# Patient Record
Sex: FEMALE | Race: WHITE | Employment: FULL TIME | URBAN - METROPOLITAN AREA
[De-identification: names, ages, dates, MRNs, and addresses within clinical notes are randomized per-mention and may not be internally consistent; named-entity substitution may affect disease eponyms.]

---

## 2017-02-16 ENCOUNTER — TELEPHONE (OUTPATIENT)
Dept: FAMILY MEDICINE | Facility: OTHER | Age: 52
End: 2017-02-16

## 2017-02-16 NOTE — TELEPHONE ENCOUNTER
Summary:    Patient is due/failing the following:   COLONOSCOPY    Action needed:   Schedule a colonoscopy    Type of outreach:    Phone, left message for patient to call back.     Questions for provider review:    None                                                                                                                                    Betzaida Glover       Chart routed to Care Team .  Panel Management Review      Patient has the following on her problem list: None      Composite cancer screening  Chart review shows that this patient is due/due soon for the following Colonoscopy

## 2017-03-24 ENCOUNTER — TELEPHONE (OUTPATIENT)
Dept: FAMILY MEDICINE | Facility: OTHER | Age: 52
End: 2017-03-24

## 2017-03-24 DIAGNOSIS — N92.6 IRREGULAR MENSTRUAL CYCLE: ICD-10-CM

## 2017-03-24 DIAGNOSIS — Z00.00 ROUTINE GENERAL MEDICAL EXAMINATION AT A HEALTH CARE FACILITY: ICD-10-CM

## 2017-03-24 RX ORDER — LEVONORGESTREL/ETHIN.ESTRADIOL 0.1-0.02MG
1 TABLET ORAL DAILY
Qty: 28 TABLET | Refills: 0 | Status: SHIPPED | OUTPATIENT
Start: 2017-03-24 | End: 2017-04-10

## 2017-03-24 NOTE — TELEPHONE ENCOUNTER
Noemy    Last Written Prescription Date: 7-25-16  Last Fill Quantity: 84,  # refills: 2   [place] [facility] prescribing provider: 12-28-16

## 2017-03-24 NOTE — TELEPHONE ENCOUNTER
Medication is being filled for 1 time refill only due to:  patient needs to establish with new provider as Nieves Kimani is no longer in clinic please call patient to help schedule OV.   Due for mammo, colon screening and help c screening per HM.     Gisela Zhou, RN, BSN

## 2017-03-24 NOTE — TELEPHONE ENCOUNTER
Spoke with pt and gave her message below. She will make an appt for next month to est with new provider.

## 2017-04-04 NOTE — PATIENT INSTRUCTIONS
Seborrheic keratosis -- please check with insurance for coverage for removal.  We can remove on chin, but would not on the anterior neck due to underlying vessels.      Preventive Health Recommendations  Female Ages 50 - 64    Yearly exam: See your health care provider every year in order to  o Review health changes.   o Discuss preventive care.    o Review your medicines if your doctor has prescribed any.      Get a Pap test every three years (unless you have an abnormal result and your provider advises testing more often).    If you get Pap tests with HPV test, you only need to test every 5 years, unless you have an abnormal result.     You do not need a Pap test if your uterus was removed (hysterectomy) and you have not had cancer.    You should be tested each year for STDs (sexually transmitted diseases) if you're at risk.     Have a mammogram every 1 to 2 years.    Have a colonoscopy at age 50, or have a yearly FIT test (stool test). These exams screen for colon cancer.      Have a cholesterol test every 5 years, or more often if advised.    Have a diabetes test (fasting glucose) every three years. If you are at risk for diabetes, you should have this test more often.     If you are at risk for osteoporosis (brittle bone disease), think about having a bone density scan (DEXA).    Shots: Get a flu shot each year. Get a tetanus shot every 10 years.    Nutrition:     Eat at least 5 servings of fruits and vegetables each day.    Eat whole-grain bread, whole-wheat pasta and brown rice instead of white grains and rice.    Talk to your provider about Calcium and Vitamin D.     Lifestyle    Exercise at least 150 minutes a week (30 minutes a day, 5 days a week). This will help you control your weight and prevent disease.    Limit alcohol to one drink per day.    No smoking.     Wear sunscreen to prevent skin cancer.     See your dentist every six months for an exam and cleaning.    See your eye doctor every 1 to 2  years.

## 2017-04-04 NOTE — PROGRESS NOTES
SUBJECTIVE:     CC: Kristin Orta is an 51 year old woman who presents for preventive health visit.     Physical   Annual:     Getting at least 3 servings of Calcium per day::  Yes    Bi-annual eye exam::  Yes    Dental care twice a year::  Yes    Sleep apnea or symptoms of sleep apnea::  None    Diet::  Regular (no restrictions)    Frequency of exercise::  6-7 days/week    Duration of exercise::  Greater than 60 minutes    Taking medications regularly::  Yes    Medication side effects::  None    Additional concerns today::  No    Currently taking birth control and is comfortable with the medication.    Today's PHQ-2 Score:   PHQ-2 ( 1999 Pfizer) 4/10/2017   Q1: Little interest or pleasure in doing things -   Q2: Feeling down, depressed or hopeless -   PHQ-2 Score -   Little interest or pleasure in doing things Not at all   Feeling down, depressed or hopeless Not at all   PHQ-2 Score 0     Abuse: Current or Past(Physical, Sexual or Emotional)- No  Do you feel safe in your environment - Yes    Social History   Substance Use Topics     Smoking status: Never Smoker     Smokeless tobacco: Never Used      Comment: No smokers in home     Alcohol use Yes      Comment: 2 drinks per month     The patient does not drink >3 drinks per day nor >7 drinks per week.    Recent Labs   Lab Test  04/06/15   1032  03/31/11   0955   CHOL  175  177   HDL  78  66   LDL  87  95   TRIG  52  77   CHOLHDLRATIO  2.2  3.0       Reviewed orders with patient.  Reviewed health maintenance and updated orders accordingly - Yes    Mammo Decision Support:  Patient over age 50, mutual decision to screen reflected in health maintenance.    Pertinent mammograms are reviewed under the imaging tab.  History of abnormal Pap smear: NO - age 30- 65 PAP every 3 years recommended    Reviewed and updated as needed this visit by clinical staff  Tobacco  Med Hx  Surg Hx  Fam Hx  Soc Hx        Reviewed and updated as needed this visit by Provider       "  Past Medical History:   Diagnosis Date     Esophageal reflux 4/2004    ?     Unspecified constipation 4/2004    relieved with dietary changes     Unspecified contraceptive management         ROS:  Constitutional, HEENT, cardiovascular, pulmonary, GI, , musculoskeletal, neuro, skin, endocrine and psych systems are negative, except as in HPI or otherwise noted     This document serves as a record of the services and decisions personally performed and made by Joya Rahman MD. It was created on her behalf by Mary Ann Toro , a trained medical scribe. The creation of this document is based the provider's statements to the medical scribe.  Mary Ann Toro, April 10, 2017 9:40 AM     Problem list, Medication list, Allergies, and Medical/Social/Surgical histories reviewed in [employer] and updated as appropriate.  OBJECTIVE:     /66 (BP Location: Left arm, Patient Position: Chair, Cuff Size: Adult Regular)  Pulse 52  Temp 97.9  F (36.6  C) (Temporal)  Resp 16  Ht 1.594 m (5' 2.75\")  Wt 59.9 kg (132 lb)  LMP 03/27/2017 (Exact Date)  Breastfeeding? No  BMI 23.57 kg/m2  EXAM:  GENERAL: healthy, alert and no distress  EYES: Eyes grossly normal to inspection, PERRL and conjunctivae and sclerae normal  HENT: ear canals and TM's normal, nose and mouth without ulcers or lesions  NECK: no adenopathy, no asymmetry, masses, or scars and thyroid normal to palpation  RESP: lungs clear to auscultation - no rales, rhonchi or wheezes  BREAST: normal without masses, tenderness or nipple discharge and no palpable axillary masses or adenopathy  CV: regular rate and rhythm, normal S1 S2, no S3 or S4, no murmur, click or rub, no peripheral edema and peripheral pulses strong  ABDOMEN: soft, nontender, no hepatosplenomegaly, no masses and bowel sounds normal  MS: no gross musculoskeletal defects noted, no edema  SKIN: SK noted on the under side of the chin.  Skin colored raised mole noted on anterior neck more c/w skin tag  NEURO: Normal " "strength and tone, mentation intact and speech normal  PSYCH: mentation appears normal, affect normal/bright    ASSESSMENT/PLAN:         ICD-10-CM    1. Routine general medical examination at a health care facility Z00.00 levonorgestrel-ethinyl estradiol (AVIANE,ALESSE,LESSINA) 0.1-20 MG-MCG per tablet   2. Screen for colon cancer Z12.11 GASTROENTEROLOGY ADULT REF PROCEDURE ONLY   3. Visit for screening mammogram Z12.31 MA SCREENING DIGITAL BILAT - Future  (s+30)   4. Need for hepatitis C screening test Z11.59    5. Perimenopausal N95.1 Follicle stimulating hormone   6. Encounter for routine adult health examination without abnormal findings Z00.00    7. Irregular menstrual cycle N92.6 levonorgestrel-ethinyl estradiol (AVIANE,ALESSE,LESSINA) 0.1-20 MG-MCG per tablet     Due to benign features of the moles, advised not needing removal, but check with insurance first.  Chin can be done here, anterior neck cannot.  GI[facility] SK handout.  Also discussed FSH for monitoring for menopause.  Planning this summer and will see if she needs further hormones if negative.    COUNSELING:  Reviewed preventive health counseling, as reflected in patient instructions       Regular exercise       Healthy diet/nutrition       Vision screening       Hearing screening       Contraception       Colon cancer screening       Consider Hep C screening for patients born between 1945 and 1965       (Meaghan)menopause management     reports that she has never smoked. She has never used smokeless tobacco.    Estimated body mass index is 23.57 kg/(m^2) as calculated from the following:    Height as of this encounter: 1.594 m (5' 2.75\").    Weight as of this encounter: 59.9 kg (132 lb).     Counseling Resources:  ATP IV Guidelines  Pooled Cohorts Equation Calculator  Breast Cancer Risk Calculator  FRAX Risk Assessment  ICSI Preventive Guidelines  Dietary Guidelines for Americans, 2010  USDA's MyPlate  ASA Prophylaxis  Lung CA Screening    The information " in this document, created by the medical scribe for me, accurately reflects the services I personally performed and the decisions made by me. I have reviewed and approved this document for accuracy.   MD Joya Carter MD, MD  [facility]

## 2017-04-10 ENCOUNTER — OFFICE VISIT (OUTPATIENT)
Dept: FAMILY MEDICINE | Facility: OTHER | Age: 52
End: 2017-04-10
Payer: COMMERCIAL

## 2017-04-10 VITALS
SYSTOLIC BLOOD PRESSURE: 102 MMHG | RESPIRATION RATE: 16 BRPM | DIASTOLIC BLOOD PRESSURE: 66 MMHG | HEIGHT: 63 IN | WEIGHT: 132 LBS | BODY MASS INDEX: 23.39 KG/M2 | TEMPERATURE: 97.9 F | HEART RATE: 52 BPM

## 2017-04-10 DIAGNOSIS — L82.1 SEBORRHEIC KERATOSES: ICD-10-CM

## 2017-04-10 DIAGNOSIS — Z00.00 ENCOUNTER FOR ROUTINE ADULT HEALTH EXAMINATION WITHOUT ABNORMAL FINDINGS: ICD-10-CM

## 2017-04-10 DIAGNOSIS — N95.1 PERIMENOPAUSAL: ICD-10-CM

## 2017-04-10 DIAGNOSIS — N92.6 IRREGULAR MENSTRUAL CYCLE: ICD-10-CM

## 2017-04-10 DIAGNOSIS — Z00.00 ROUTINE GENERAL MEDICAL EXAMINATION AT A HEALTH CARE FACILITY: Primary | ICD-10-CM

## 2017-04-10 DIAGNOSIS — Z12.31 VISIT FOR SCREENING MAMMOGRAM: ICD-10-CM

## 2017-04-10 DIAGNOSIS — Z12.11 SCREEN FOR COLON CANCER: ICD-10-CM

## 2017-04-10 DIAGNOSIS — Z11.59 NEED FOR HEPATITIS C SCREENING TEST: ICD-10-CM

## 2017-04-10 PROCEDURE — 99396 PREV VISIT EST AGE 40-64: CPT | Performed by: FAMILY MEDICINE

## 2017-04-10 RX ORDER — LEVONORGESTREL/ETHIN.ESTRADIOL 0.1-0.02MG
1 TABLET ORAL DAILY
Qty: 84 TABLET | Refills: 3 | Status: SHIPPED | OUTPATIENT
Start: 2017-04-10 | End: 2018-03-27

## 2017-04-10 ASSESSMENT — PAIN SCALES - GENERAL: PAINLEVEL: NO PAIN (0)

## 2017-04-10 NOTE — NURSING NOTE
"Chief Complaint   Patient presents with     Physical     Panel Management     height, mammo, colon, hep c       Initial /66 (BP Location: Left arm, Patient Position: Chair, Cuff Size: Adult Regular)  Pulse 52  Temp 97.9  F (36.6  C) (Temporal)  Resp 16  Ht 5' 2.75\" (1.594 m)  Wt 132 lb (59.9 kg)  LMP 03/27/2017 (Exact Date)  Breastfeeding? No  BMI 23.57 kg/m2 Estimated body mass index is 23.57 kg/(m^2) as calculated from the following:    Height as of this encounter: 5' 2.75\" (1.594 m).    Weight as of this encounter: 132 lb (59.9 kg).  Medication Reconciliation: complete  "

## 2017-04-10 NOTE — MR AVS SNAPSHOT
After Visit Summary   4/10/2017    Kristin Orta    MRN: [id number]           Patient Information     Date Of Birth          1965        Visit Information        Provider Department      4/10/2017 9:30 AM Joya Rahman MD [facility]        Today's Diagnoses     Routine general medical examination at a health care facility    -  1    Screen for colon cancer        Visit for screening mammogram        Need for hepatitis C screening test        Perimenopausal        Encounter for routine adult health examination without abnormal findings        Irregular menstrual cycle        Seborrheic keratoses          Care Instructions      Seborrheic keratosis -- please check with insurance for coverage for removal.  We can remove on chin, but would not on the anterior neck due to underlying vessels.      Preventive Health Recommendations  Female Ages 50 - 64    Yearly exam: See your health care provider every year in order to  o Review health changes.   o Discuss preventive care.    o Review your medicines if your doctor has prescribed any.      Get a Pap test every three years (unless you have an abnormal result and your provider advises testing more often).    If you get Pap tests with HPV test, you only need to test every 5 years, unless you have an abnormal result.     You do not need a Pap test if your uterus was removed (hysterectomy) and you have not had cancer.    You should be tested each year for STDs (sexually transmitted diseases) if you're at risk.     Have a mammogram every 1 to 2 years.    Have a colonoscopy at age 50, or have a yearly FIT test (stool test). These exams screen for colon cancer.      Have a cholesterol test every 5 years, or more often if advised.    Have a diabetes test (fasting glucose) every three years. If you are at risk for diabetes, you should have this test more often.     If you are at risk for osteoporosis (brittle bone disease), think about having  a bone density scan (DEXA).    Shots: Get a flu shot each year. Get a tetanus shot every 10 years.    Nutrition:     Eat at least 5 servings of fruits and vegetables each day.    Eat whole-grain bread, whole-wheat pasta and brown rice instead of white grains and rice.    Talk to your provider about Calcium and Vitamin D.     Lifestyle    Exercise at least 150 minutes a week (30 minutes a day, 5 days a week). This will help you control your weight and prevent disease.    Limit alcohol to one drink per day.    No smoking.     Wear sunscreen to prevent skin cancer.     See your dentist every six months for an exam and cleaning.    See your eye doctor every 1 to 2 years.          Follow-ups after your visit        Additional Services     GASTROENTEROLOGY ADULT REF PROCEDURE ONLY       Last Lab Result: No results found for: CR  There is no height or weight on file to calculate BMI.     Needed:  No  Language:  English    Patient will be contacted to schedule procedure.     Please be aware that coverage of these services is subject to the terms and limitations of your health insurance plan.  Call member services at your health plan with any benefit or coverage questions.  Any procedures must be performed at a [facility] facility OR coordinated by your clinic's referral office.    Please bring the following with you to your appointment:    (1) Any X-Rays, CTs or MRIs which have been performed.  Contact the facility where they were done to arrange for  prior to your scheduled appointment.    (2) List of current medications   (3) This referral request   (4) Any documents/labs given to you for this referral                  Your next 10 appointments already scheduled     Apr 13, 2017  8:45 AM CDT   MA SCREENING DIGITAL BILATERAL with PHMA1   [facility] ([facility])    [address] [place] [id number] [phone]           Do not use any powder, lotion or deodorant  under your arms or on your breast. If you do, we will ask you to remove it before your exam.  Wear comfortable, two-piece clothing.  If you have any allergies, tell your care team.  Bring any previous mammograms from other facilities or have them mailed to the breast center.              Future tests that were ordered for you today     Open Future Orders        Priority Expected Expires Ordered    Follicle stimulating hormone Routine  8/10/2017 4/10/2017    MA SCREENING DIGITAL BILAT - Future  (s+30) Routine  4/4/2018 4/10/2017            Who to contact     If you have questions or need follow up information about today's clinic visit or your schedule please contact [facility] directly at [phone].  Normal or non-critical lab and imaging results will be communicated to you by MyChart, letter or phone within 4 business days after the clinic has received the results. If you do not hear from us within 7 days, please contact the clinic through [employer] or phone. If you have a critical or abnormal lab result, we will notify you by phone as soon as possible.  Submit refill requests through [employer] or call your pharmacy and they will forward the refill request to us. Please allow 3 business days for your refill to be completed.          Additional Information About Your Visit        [employer] Information     [employer] gives you secure access to your electronic health record. If you see a primary care provider, you can also send messages to your care team and make appointments. If you have questions, please call your primary care clinic.  If you do not have a primary care provider, please call [phone] and they will assist you.        Care EveryWhere ID     This is your Care EveryWhere ID. This could be used by other organizations to access your [facility] medical records  [id number]        Your Vitals Were     Pulse Temperature Respirations Height Last Period Breastfeeding?    52 97.9  F (36.6  C)  "(Temporal) 16 5' 2.75\" (1.594 m) 03/27/2017 (Exact Date) No    BMI (Body Mass Index)                   23.57 kg/m2            Blood Pressure from Last 3 Encounters:   04/10/17 102/66   06/22/16 122/80   04/08/16 104/60    Weight from Last 3 Encounters:   04/10/17 132 lb (59.9 kg)   06/22/16 130 lb (59 kg)   04/08/16 131 lb 9.6 oz (59.7 kg)              We Performed the Following     GASTROENTEROLOGY ADULT REF PROCEDURE ONLY          Today's Medication Changes          These changes are accurate as of: 4/10/17 10:03 AM.  If you have any questions, ask your nurse or doctor.               Stop taking these medicines if you haven't already. Please contact your care team if you have questions.     norgestimate-ethinyl estradiol 0.25-35 MG-MCG per tablet   Commonly known as:  ORTHO-CYCLEN, SPRINTEC   Stopped by:  Joya Rahman MD                Where to get your medicines      These medications were sent to [facility] [address] [place] [address]     Phone:  [phone]     levonorgestrel-ethinyl estradiol 0.1-20 MG-MCG per tablet                Primary Care Provider Office Phone # Fax #    Joya Rahman [phone]       [facility]  [address] [place] [id number]        Thank you!     Thank you for choosing [facility]  for your care. Our goal is always to provide you with excellent care. Hearing back from our patients is one way we can continue to improve our services. Please take a few minutes to complete the written survey that you may receive in the mail after your visit with us. Thank you!             Your Updated Medication List - Protect others around you: Learn how to safely use, store and throw away your medicines at www.disposemymeds.org.          This list is accurate as of: 4/10/17 10:03 AM.  Always use your most recent med list.                   Brand Name Dispense Instructions for use    " levonorgestrel-ethinyl estradiol 0.1-20 MG-MCG per tablet    NUSRAT ANTHONY LESSINA 84 tablet    Take 1 tablet by mouth daily       Multi-vitamin Tabs tablet      Take 1 tablet by mouth daily       VITAMIN D (CHOLECALCIFEROL) PO      Take 400 Units by mouth 2 times daily

## 2017-04-11 ENCOUNTER — TELEPHONE (OUTPATIENT)
Dept: FAMILY MEDICINE | Facility: OTHER | Age: 52
End: 2017-04-11

## 2017-04-11 NOTE — TELEPHONE ENCOUNTER
Left message for patient to return call to schedule colonoscopy or EGD. If Komal or Shobha are unavailable, please transfer to the surgery center.     OK to schedule with ZACHARY or Ed

## 2017-04-13 ENCOUNTER — HOSPITAL ENCOUNTER (OUTPATIENT)
Dept: MAMMOGRAPHY | Facility: CLINIC | Age: 52
Discharge: HOME | End: 2017-04-13
Attending: PHYSICIAN ASSISTANT | Admitting: PHYSICIAN ASSISTANT
Payer: COMMERCIAL

## 2017-04-13 DIAGNOSIS — Z12.31 VISIT FOR SCREENING MAMMOGRAM: ICD-10-CM

## 2017-04-13 PROCEDURE — G0202 SCR MAMMO BI INCL CAD: HCPCS

## 2017-06-07 ENCOUNTER — TELEPHONE (OUTPATIENT)
Dept: FAMILY MEDICINE | Facility: OTHER | Age: 52
End: 2017-06-07

## 2017-06-07 NOTE — TELEPHONE ENCOUNTER
"06/07/17        PHS call not required completed HM screening      Canh \"Xi\" Sen  Central Scheduler            "

## 2017-07-14 ENCOUNTER — TELEPHONE (OUTPATIENT)
Dept: FAMILY MEDICINE | Facility: OTHER | Age: 52
End: 2017-07-14

## 2017-07-14 NOTE — TELEPHONE ENCOUNTER
Reason for call:  Medication  Reason for Call:  Medication or medication refill:    Do you use a [facility]?  Name of the pharmacy and phone number for the current request:  [facility] - [phone]    Name of the medication requested: birth control    Other request: was told to contact Dr Rahman for the refill    Can we leave a detailed message on this number? YES    Phone number patient can be reached at: Home number on file [phone] (home)    Best Time: any    Call taken on 7/14/2017 at 11:15 AM by Christina Ramos

## 2017-07-14 NOTE — TELEPHONE ENCOUNTER
I called pharmacy and they still have refills. I informed pt, no further questions at this time.  Natalie Rivas, CMA

## 2017-07-19 ENCOUNTER — SURGERY (OUTPATIENT)
Age: 52
End: 2017-07-19

## 2017-07-19 ENCOUNTER — HOSPITAL ENCOUNTER (OUTPATIENT)
Facility: AMBULATORY SURGERY CENTER | Age: 52
Discharge: HOME | End: 2017-07-19
Attending: INTERNAL MEDICINE | Admitting: INTERNAL MEDICINE
Payer: COMMERCIAL

## 2017-07-19 VITALS
SYSTOLIC BLOOD PRESSURE: 108 MMHG | WEIGHT: 125 LBS | TEMPERATURE: 98 F | RESPIRATION RATE: 16 BRPM | BODY MASS INDEX: 22.15 KG/M2 | OXYGEN SATURATION: 100 % | HEIGHT: 63 IN | DIASTOLIC BLOOD PRESSURE: 82 MMHG

## 2017-07-19 LAB — COLONOSCOPY: NORMAL

## 2017-07-19 PROCEDURE — 45378 DIAGNOSTIC COLONOSCOPY: CPT

## 2017-07-19 PROCEDURE — G8907 PT DOC NO EVENTS ON DISCHARG: HCPCS

## 2017-07-19 PROCEDURE — G8918 PT W/O PREOP ORDER IV AB PRO: HCPCS

## 2017-07-19 RX ORDER — LIDOCAINE 40 MG/G
CREAM TOPICAL
Status: DISCONTINUED | OUTPATIENT
Start: 2017-07-19 | End: 2017-07-20 | Stop reason: HOSPADM

## 2017-07-19 RX ORDER — ONDANSETRON 2 MG/ML
4 INJECTION INTRAMUSCULAR; INTRAVENOUS
Status: DISCONTINUED | OUTPATIENT
Start: 2017-07-19 | End: 2017-07-20 | Stop reason: HOSPADM

## 2017-07-19 RX ORDER — FENTANYL CITRATE 50 UG/ML
INJECTION, SOLUTION INTRAMUSCULAR; INTRAVENOUS PRN
Status: DISCONTINUED | OUTPATIENT
Start: 2017-07-19 | End: 2017-07-19 | Stop reason: HOSPADM

## 2017-07-19 RX ADMIN — FENTANYL CITRATE 100 MCG: 50 INJECTION, SOLUTION INTRAMUSCULAR; INTRAVENOUS at 07:39

## 2017-09-06 ENCOUNTER — TELEPHONE (OUTPATIENT)
Dept: FAMILY MEDICINE | Facility: OTHER | Age: 52
End: 2017-09-06

## 2017-09-06 NOTE — TELEPHONE ENCOUNTER
Pt returning call relayed message below .pt would like a call regarding details on [facility]SH lab [phone]

## 2017-09-06 NOTE — TELEPHONE ENCOUNTER
Panel Management Review      Patient has the following on her problem list: None      Composite cancer screening  Chart review shows that this patient is due/due soon for the following None  Summary:    Patient is due/failing the following:   FSH    Action needed:   Patient needs non-fasting lab only appointment    Type of outreach:    Phone, left message for patient to call back.     Questions for provider review:    None                                                                                                                                    Licha Veloz CMA     Chart routed to Care Team .

## 2018-03-27 DIAGNOSIS — N92.6 IRREGULAR MENSTRUAL CYCLE: ICD-10-CM

## 2018-03-27 DIAGNOSIS — Z00.00 ROUTINE GENERAL MEDICAL EXAMINATION AT A HEALTH CARE FACILITY: ICD-10-CM

## 2018-03-29 RX ORDER — TIMOLOL MALEATE 5 MG/ML
SOLUTION/ DROPS OPHTHALMIC
Qty: 28 TABLET | Refills: 0 | Status: SHIPPED | OUTPATIENT
Start: 2018-03-29 | End: 2018-04-18

## 2018-03-29 NOTE — TELEPHONE ENCOUNTER
"Requested Prescriptions   Pending Prescriptions Disp Refills     VIENVA 0.1-20 MG-MCG per tablet [Pharmacy Med Name: Vienva Oral Tablet 0.1-20 MG-MCG] 84 tablet 2     Sig: TAKE ONE TABLET BY MOUTH ONE TIME DAILY    Contraceptives Protocol Passed    3/27/2018  7:01 AM       Passed - Patient is not a current smoker if age is 35 or older       Passed - Recent (12 mo) or future (30 days) visit within the authorizing provider's specialty    Patient had office visit in the last 12 months or has a visit in the next 30 days with authorizing provider or within the authorizing provider's specialty.  See \"Patient Info\" tab in inbasket, or \"Choose Columns\" in Meds & Orders section of the refill encounter.    04/10/2017       Passed - No active pregnancy on record       Passed - No positive pregnancy test in past 12 months        Medication is being filled for 1 time refill only due to:  Patient needs to be seen because it has been more than one year since last visit.     Please call and help schedule.  Nelson Abrams, RN, BSN          "

## 2018-04-11 NOTE — PROGRESS NOTES
SUBJECTIVE:   CC: Kristin Orta is an 52 year old woman who presents for preventive health visit.     Physical   Annual:     Getting at least 3 servings of Calcium per day::  Yes    Bi-annual eye exam::  NO    Dental care twice a year::  Yes    Sleep apnea or symptoms of sleep apnea::  None    Frequency of exercise::  6-7 days/week    Duration of exercise::  Greater than 60 minutes    Taking medications regularly::  Yes    Additional concerns today::  No          Doing well. No new medical concerns. Not interested in HIV testing, no high risk concerns.     Still taking birth control. She thinks that she may want to stop taking them soon. Her  has a vasectomy so she isn't worried about taking it for contraception. She is only still taking them because she has for the last 30 years and is worried about what will happen if she stops them.    Answers for HPI/ROS submitted by the patient on 4/18/2018   PHQ-2 Score: 0    Abuse: Current or Past(Physical, Sexual or Emotional)- No  Do you feel safe in your environment - Yes    Social History   Substance Use Topics     Smoking status: Never Smoker     Smokeless tobacco: Never Used      Comment: No smokers in home     Alcohol use Yes      Comment: 2 drinks per month     Alcohol Use 4/18/2018   If you drink alcohol do you typically have greater than 3 drinks per day OR greater than 7 drinks per week? No   No flowsheet data found.    Reviewed orders with patient.  Reviewed health maintenance and updated orders accordingly - Yes  Labs reviewed in [facility]    Patient over age 50, mutual decision to screen reflected in health maintenance. Patient reports a family history of breast cancer and fibrous granulomas.     Pertinent mammograms are reviewed under the imaging tab.  History of abnormal Pap smear: NO - age 30-65 PAP every 5 years with negative HPV co-testing recommended    Reviewed and updated as needed this visit by clinical staff  Tobacco  Allergies  Meds   "Med Hx  Surg Hx  Fam Hx  Soc Hx      Reviewed and updated as needed this visit by Provider        Past Medical History:   Diagnosis Date     Esophageal reflux 4/2004    ?     Unspecified constipation 4/2004    relieved with dietary changes     Unspecified contraceptive management       Past Surgical History:   Procedure Laterality Date     COLONOSCOPY WITH CO2 INSUFFLATION N/A 7/19/2017    Procedure: COLONOSCOPY WITH CO2 INSUFFLATION;  Dr. Joya Rahman/Colonscopy/Colonscreening/BMI:23.6/[place] Fax: [phone];  Surgeon: Duane, William Charles, MD;  Location:  OR     NO HISTORY OF SURGERY         Review of Systems   Constitutional: Negative for chills and fever.   HENT: Negative for congestion, hearing loss and sore throat.    Eyes: Negative for pain and visual disturbance.   Respiratory: Negative for cough and shortness of breath.    Cardiovascular: Negative for palpitations and peripheral edema.   Gastrointestinal: Negative for abdominal pain, constipation, diarrhea, heartburn, hematochezia and nausea.   Genitourinary: Negative for dysuria, frequency, genital sores, pelvic pain, urgency, vaginal bleeding and vaginal discharge.   Musculoskeletal: Negative for joint swelling and myalgias.   Skin: Negative for rash.   Neurological: Negative for weakness, headaches and paresthesias.   Psychiatric/Behavioral: Negative for mood changes. The patient is not nervous/anxious.      This document serves as a record of the services and decisions personally performed and made by Joya Rahman MD. It was created on her behalf by Romy Catalan, a trained medical scribe. The creation of this document is based the provider's statements to the medical scribe.  Romy Catalan, April 18, 2018 2:28 PM       OBJECTIVE:   /60  Pulse 55  Temp 97.1  F (36.2  C) (Temporal)  Ht 5' 3.5\" (1.613 m)  Wt 131 lb (59.4 kg)  LMP 03/26/2018  SpO2 100%  Breastfeeding? No  BMI 22.84 kg/m2  Physical Exam  GENERAL APPEARANCE: healthy, alert " and no distress  EYES: Eyes grossly normal to inspection, PERRL and conjunctivae and sclerae normal  HENT: ear canals and TM's normal, nose and mouth without ulcers or lesions, oropharynx clear and oral mucous membranes moist  NECK: no adenopathy, no asymmetry, masses, or scars and thyroid normal to palpation  RESP: lungs clear to auscultation - no rales, rhonchi or wheezes  BREAST: normal without masses, tenderness or nipple discharge and no palpable axillary masses or adenopathy  CV: regular rate and rhythm, normal S1 S2, no S3 or S4, no murmur, click or rub, no peripheral edema and peripheral pulses strong  ABDOMEN: soft, nontender, no hepatosplenomegaly, no masses and bowel sounds normal   (female): normal female external genitalia, normal urethral meatus, vaginal mucosal atrophy noted, normal cervix, adnexae, and uterus without masses or abnormal discharge  MS: no musculoskeletal defects are noted and gait is age appropriate without ataxia  SKIN: no suspicious lesions or rashes  NEURO: Normal strength and tone, sensory exam grossly normal, mentation intact and speech normal  PSYCH: mentation appears normal and affect normal/bright    ASSESSMENT/PLAN:       ICD-10-CM    1. Encounter for routine adult health examination without abnormal findings Z00.00    2. Visit for screening mammogram Z12.31    3. Screening for malignant neoplasm of cervix Z12.4 Pap imaged thin layer screen with HPV - recommended age 30 - 65 years (select HPV order below)     HPV High Risk Types DNA Cervical     PAP: Papanicolaou smear performed with the patient's informed consent. Cervical swab was collected and sent to the lab for stain analysis and HPV reflex. The patient will be notified results of this test.     Mammogram: Pt has mammogram scheduled for tomorrow. Advised annual given family history, can consider 3D mammogram.     Birth Control: Plan to stop birth control pills soon. Can restart if she starts having heavier or abnormal  "menstrual cycles after stopping it, or if she has unbearable menopausal symptoms such as hot flashes.     COUNSELING:  Reviewed preventive health counseling, as reflected in patient instructions     reports that she has never smoked. She has never used smokeless tobacco.    Estimated body mass index is 22.84 kg/(m^2) as calculated from the following:    Height as of this encounter: 5' 3.5\" (1.613 m).    Weight as of this encounter: 131 lb (59.4 kg).     Counseling Resources:  ATP IV Guidelines  Pooled Cohorts Equation Calculator  Breast Cancer Risk Calculator  FRAX Risk Assessment  ICSI Preventive Guidelines  Dietary Guidelines for Americans, 2010  [employer] MyPlate  ASA Prophylaxis  Lung CA Screening    The information in this document, created by the medical scribe for me, accurately reflects the services I personally performed and the decisions made by me. I have reviewed and approved this document for accuracy.   MD Joya Carter MD, MD  [facility]  "

## 2018-04-18 ENCOUNTER — OFFICE VISIT (OUTPATIENT)
Dept: FAMILY MEDICINE | Facility: OTHER | Age: 53
End: 2018-04-18
Payer: COMMERCIAL

## 2018-04-18 VITALS
BODY MASS INDEX: 22.36 KG/M2 | SYSTOLIC BLOOD PRESSURE: 110 MMHG | HEART RATE: 55 BPM | WEIGHT: 131 LBS | DIASTOLIC BLOOD PRESSURE: 60 MMHG | TEMPERATURE: 97.1 F | HEIGHT: 64 IN | OXYGEN SATURATION: 100 %

## 2018-04-18 DIAGNOSIS — Z12.4 SCREENING FOR MALIGNANT NEOPLASM OF CERVIX: ICD-10-CM

## 2018-04-18 DIAGNOSIS — Z12.31 VISIT FOR SCREENING MAMMOGRAM: ICD-10-CM

## 2018-04-18 DIAGNOSIS — Z00.00 ENCOUNTER FOR ROUTINE ADULT HEALTH EXAMINATION WITHOUT ABNORMAL FINDINGS: Primary | ICD-10-CM

## 2018-04-18 PROCEDURE — G0145 SCR C/V CYTO,THINLAYER,RESCR: HCPCS | Performed by: FAMILY MEDICINE

## 2018-04-18 PROCEDURE — 87624 HPV HI-RISK TYP POOLED RSLT: CPT | Performed by: FAMILY MEDICINE

## 2018-04-18 PROCEDURE — 99396 PREV VISIT EST AGE 40-64: CPT | Performed by: FAMILY MEDICINE

## 2018-04-18 ASSESSMENT — ENCOUNTER SYMPTOMS
HEARTBURN: 0
SHORTNESS OF BREATH: 0
PALPITATIONS: 0
FREQUENCY: 0
FEVER: 0
EYE PAIN: 0
SORE THROAT: 0
JOINT SWELLING: 0
CHILLS: 0
WEAKNESS: 0
PARESTHESIAS: 0
CONSTIPATION: 0
NAUSEA: 0
COUGH: 0
DYSURIA: 0
DIARRHEA: 0
ABDOMINAL PAIN: 0
NERVOUS/ANXIOUS: 0
HEADACHES: 0
MYALGIAS: 0
HEMATOCHEZIA: 0

## 2018-04-18 NOTE — MR AVS SNAPSHOT
After Visit Summary   4/18/2018    Kristin Orta    MRN: [id number]           Patient Information     Date Of Birth          1965        Visit Information        Provider Department      4/18/2018 2:15 PM Joya Rahman MD [facility]        Today's Diagnoses     Encounter for routine adult health examination without abnormal findings    -  1    Visit for screening mammogram        Screening for malignant neoplasm of cervix          Care Instructions      Preventive Health Recommendations  Female Ages 50 - 64    Yearly exam: See your health care provider every year in order to  o Review health changes.   o Discuss preventive care.    o Review your medicines if your doctor has prescribed any.      Get a Pap test every three years (unless you have an abnormal result and your provider advises testing more often).    If you get Pap tests with HPV test, you only need to test every 5 years, unless you have an abnormal result.     You do not need a Pap test if your uterus was removed (hysterectomy) and you have not had cancer.    You should be tested each year for STDs (sexually transmitted diseases) if you're at risk.     Have a mammogram every 1 to 2 years.    Have a colonoscopy at age 50, or have a yearly FIT test (stool test). These exams screen for colon cancer.      Have a cholesterol test every 5 years, or more often if advised.    Have a diabetes test (fasting glucose) every three years. If you are at risk for diabetes, you should have this test more often.     If you are at risk for osteoporosis (brittle bone disease), think about having a bone density scan (DEXA).    Shots: Get a flu shot each year. Get a tetanus shot every 10 years.    Nutrition:     Eat at least 5 servings of fruits and vegetables each day.    Eat whole-grain bread, whole-wheat pasta and brown rice instead of white grains and rice.    Talk to your provider about Calcium and Vitamin D.      Lifestyle    Exercise at least 150 minutes a week (30 minutes a day, 5 days a week). This will help you control your weight and prevent disease.    Limit alcohol to one drink per day.    No smoking.     Wear sunscreen to prevent skin cancer.     See your dentist every six months for an exam and cleaning.    See your eye doctor every 1 to 2 years.            Follow-ups after your visit        Your next 10 appointments already scheduled     Apr 19, 2018  2:00 PM CDT   (Arrive by 1:45 PM)   MA SCREENING DIGITAL BILATERAL with [facility]    [address] [id number] [phone]           Do not use any powder, lotion or deodorant under your arms or on your breast. If you do, we will ask you to remove it before your exam.  Wear comfortable, two-piece clothing.  If you have any allergies, tell your care team.  Bring any previous mammograms from other facilities or have them mailed to the breast center.              Who to contact     If you have questions or need follow up information about today's clinic visit or your schedule please contact [facility] directly at [phone].  Normal or non-critical lab and imaging results will be communicated to you by MyChart, letter or phone within 4 business days after the clinic has received the results. If you do not hear from us within 7 days, please contact the clinic through [employer] or phone. If you have a critical or abnormal lab result, we will notify you by phone as soon as possible.  Submit refill requests through [employer] or call your pharmacy and they will forward the refill request to us. Please allow 3 business days for your refill to be completed.          Additional Information About Your Visit        [employer] Information     [employer] gives you secure access to your electronic health record. If you see a primary care provider, you can also send messages to your care team and  "make appointments. If you have questions, please call your primary care clinic.  If you do not have a primary care provider, please call [phone] and they will assist you.        Care EveryWhere ID     This is your Care EveryWhere ID. This could be used by other organizations to access your [place] medical records  [id number]        Your Vitals Were     Pulse Temperature Height Last Period Pulse Oximetry Breastfeeding?    55 97.1  F (36.2  C) (Temporal) 5' 3.5\" (1.613 m) 03/26/2018 100% No    BMI (Body Mass Index)                   22.84 kg/m2            Blood Pressure from Last 3 Encounters:   04/18/18 110/60   07/19/17 108/82   04/10/17 102/66    Weight from Last 3 Encounters:   04/18/18 131 lb (59.4 kg)   07/12/17 125 lb (56.7 kg)   04/10/17 132 lb (59.9 kg)              We Performed the Following     HPV High Risk Types DNA Cervical     Pap imaged thin layer screen with HPV - recommended age 30 - 65 years (select HPV order below)          Today's Medication Changes          These changes are accurate as of 4/18/18  3:15 PM.  If you have any questions, ask your nurse or doctor.               Stop taking these medicines if you haven't already. Please contact your care team if you have questions.     VIENVA 0.1-20 MG-MCG per tablet   Generic drug:  levonorgestrel-ethinyl estradiol   Stopped by:  Joya Rahman MD                    Primary Care Provider Office Phone # Fax #    Joya Rahman [phone]       [address] [place] [id number]        Equal Access to Services     [facility] AH: Hadii gregoria ortega Soannamaria, waaxda luqadaha, qaybta kaalmasalvador cedeno. So [facility] [phone].    ATENCIÓN: Si habla español, tiene a naik disposición servicios gratuitos de asistencia lingüística. Llame al [phone].    We comply with applicable federal civil rights laws and [place] laws. We do not discriminate on the basis of race, color, national " origin, age, disability, sex, sexual orientation, or gender identity.            Thank you!     Thank you for choosing [facility]  for your care. Our goal is always to provide you with excellent care. Hearing back from our patients is one way we can continue to improve our services. Please take a few minutes to complete the written survey that you may receive in the mail after your visit with us. Thank you!             Your Updated Medication List - Protect others around you: Learn how to safely use, store and throw away your medicines at www.disposemymeds.org.          This list is accurate as of 4/18/18  3:15 PM.  Always use your most recent med list.                   Brand Name Dispense Instructions for use Diagnosis    Multi-vitamin Tabs tablet      Take 1 tablet by mouth daily        VITAMIN D (CHOLECALCIFEROL) PO      Take 400 Units by mouth 2 times daily

## 2018-04-19 ENCOUNTER — RADIANT APPOINTMENT (OUTPATIENT)
Dept: MAMMOGRAPHY | Facility: OTHER | Age: 53
End: 2018-04-19
Payer: COMMERCIAL

## 2018-04-19 DIAGNOSIS — Z12.31 VISIT FOR SCREENING MAMMOGRAM: ICD-10-CM

## 2018-04-19 PROCEDURE — 77067 SCR MAMMO BI INCL CAD: CPT | Mod: TC

## 2018-04-23 LAB
COPATH REPORT: NORMAL
PAP: NORMAL

## 2018-04-25 LAB
FINAL DIAGNOSIS: NORMAL
HPV HR 12 DNA CVX QL NAA+PROBE: NEGATIVE
HPV16 DNA SPEC QL NAA+PROBE: NEGATIVE
HPV18 DNA SPEC QL NAA+PROBE: NEGATIVE
SPECIMEN DESCRIPTION: NORMAL
SPECIMEN SOURCE CVX/VAG CYTO: NORMAL

## 2019-04-17 NOTE — PATIENT INSTRUCTIONS
Check at the pharmacy for coverage of the new shingles vaccine, Shingrix. If it is not covered now, it may be covered later in the year. Shingrix is given in a 2 shot series, between 2 and 6 months apart. It is recommended for adults age 50 and older. Initial studies have indicated that this new vaccine is 85-90% effective at preventing shingles, and is preferred over the old Zostavax vaccine.     Preventive Health Recommendations  Female Ages 50 - 64    Yearly exam: See your health care provider every year in order to  o Review health changes.   o Discuss preventive care.    o Review your medicines if your doctor has prescribed any.      Get a Pap test every three years (unless you have an abnormal result and your provider advises testing more often).    If you get Pap tests with HPV test, you only need to test every 5 years, unless you have an abnormal result.     You do not need a Pap test if your uterus was removed (hysterectomy) and you have not had cancer.    You should be tested each year for STDs (sexually transmitted diseases) if you're at risk.     Have a mammogram every 1 to 2 years.    Have a colonoscopy at age 50, or have a yearly FIT test (stool test). These exams screen for colon cancer.      Have a cholesterol test every 5 years, or more often if advised.    Have a diabetes test (fasting glucose) every three years. If you are at risk for diabetes, you should have this test more often.     If you are at risk for osteoporosis (brittle bone disease), think about having a bone density scan (DEXA).    Shots: Get a flu shot each year. Get a tetanus shot every 10 years.    Nutrition:     Eat at least 5 servings of fruits and vegetables each day.    Eat whole-grain bread, whole-wheat pasta and brown rice instead of white grains and rice.    Get adequate Calcium and Vitamin D.     Lifestyle    Exercise at least 150 minutes a week (30 minutes a day, 5 days a week). This will help you control your weight and  prevent disease.    Limit alcohol to one drink per day.    No smoking.     Wear sunscreen to prevent skin cancer.     See your dentist every six months for an exam and cleaning.    See your eye doctor every 1 to 2 years.

## 2019-04-17 NOTE — PROGRESS NOTES
SUBJECTIVE:   CC: Kristin Orta is an 53 year old woman who presents for preventive health visit.     Healthy Habits:     Getting at least 3 servings of Calcium per day:  Yes    Bi-annual eye exam:  Yes    Dental care twice a year:  Yes    Sleep apnea or symptoms of sleep apnea:  None    Diet:  Regular (no restrictions)    Frequency of exercise:  6-7 days/week    Duration of exercise:  Greater than 60 minutes    Taking medications regularly:  Yes    Medication side effects:  None    PHQ-2 Total Score: 0    Additional concerns today:  Yes (Right wrist pain/swelling/weakness x1 year - possible tendonitis? Several moles on face)    Right Wrist Pain  Started about 5 months ago with pain and has started to become swollen. The pain is along the lateral edge of her wrist, and throughout the anterior muscles of her forearm, and is tender to the touch. It has made it more difficult to do certain actions, such as using scissors.     Today's PHQ-2 Score:   PHQ-2 ( 1999 Pfizer) 4/25/2019   Q1: Little interest or pleasure in doing things 0   Q2: Feeling down, depressed or hopeless 0   PHQ-2 Score 0   Q1: Little interest or pleasure in doing things Not at all   Q2: Feeling down, depressed or hopeless Not at all   PHQ-2 Score 0       Abuse: Current or Past(Physical, Sexual or Emotional)- No  Do you feel safe in your environment? Yes    Social History     Tobacco Use     Smoking status: Never Smoker     Smokeless tobacco: Never Used     Tobacco comment: No smokers in home   Substance Use Topics     Alcohol use: Yes     Comment: 2 drinks per month     If you drink alcohol do you typically have >3 drinks per day or >7 drinks per week? No    Alcohol Use 4/25/2019   Prescreen: >3 drinks/day or >7 drinks/week? Not Applicable   Prescreen: >3 drinks/day or >7 drinks/week? -   No flowsheet data found.    Reviewed orders with patient.  Reviewed health maintenance and updated orders accordingly - Yes  Labs reviewed in  [facility]    Mammogram Screening: Patient over age 50, mutual decision to screen reflected in health maintenance.    Pertinent mammograms are reviewed under the imaging tab.  History of abnormal Pap smear: NO - age 30-65 PAP every 5 years with negative HPV co-testing recommended  PAP / HPV Latest Ref Rng & Units 4/18/2018 4/6/2015 4/6/2012   PAP - NIL NIL NIL   HPV 16 DNA NEG:Negative Negative - -   HPV 18 DNA NEG:Negative Negative - -   OTHER HR HPV NEG:Negative Negative - -     Reviewed and updated as needed this visit by clinical staff  Tobacco  Allergies  Meds  Med Hx  Surg Hx  Fam Hx  Soc Hx        Reviewed and updated as needed this visit by Provider        Past Medical History:   Diagnosis Date     Esophageal reflux 4/2004    ?     Unspecified constipation 4/2004    relieved with dietary changes     Unspecified contraceptive management       Past Surgical History:   Procedure Laterality Date     COLONOSCOPY WITH CO2 INSUFFLATION N/A 7/19/2017    Procedure: COLONOSCOPY WITH CO2 INSUFFLATION;  Dr. Joya Rahman/Colonscopy/Colonscreening/BMI:23.6/[place] Fax: [phone];  Surgeon: Duane, William Charles, MD;  Location:  OR     NO HISTORY OF SURGERY         Review of Systems   Constitutional: Negative for chills and fever.   HENT: Negative for congestion, ear pain, hearing loss and sore throat.    Eyes: Negative for pain and visual disturbance.   Respiratory: Negative for cough and shortness of breath.    Cardiovascular: Negative for chest pain, palpitations and peripheral edema.   Gastrointestinal: Negative for abdominal pain, constipation, diarrhea, heartburn, hematochezia and nausea.   Genitourinary: Negative for dysuria, frequency, genital sores, hematuria and urgency.   Musculoskeletal: Negative for arthralgias, joint swelling and myalgias.   Skin: Negative for rash.   Neurological: Negative for dizziness, weakness, headaches and paresthesias.   Psychiatric/Behavioral: Negative for mood changes. The  "patient is not nervous/anxious.       OBJECTIVE:   /64 (BP Location: Right arm, Patient Position: Chair, Cuff Size: Adult Regular)   Pulse 56   Temp 98.8  F (37.1  C) (Temporal)   Resp 12   Ht 1.6 m (5' 3\")   Wt 58.1 kg (128 lb)   SpO2 100%   BMI 22.67 kg/m    Physical Exam  GENERAL: healthy, alert and no distress  EYES: Eyes grossly normal to inspection, PERRL and conjunctivae and sclerae normal  HENT: ear canals and TM's normal, nose and mouth without ulcers or lesions  NECK: no adenopathy, no asymmetry, masses, or scars and thyroid normal to palpation  RESP: lungs clear to auscultation - no rales, rhonchi or wheezes  BREAST: normal without masses, tenderness or nipple discharge and no palpable axillary masses or adenopathy  CV: regular rate and rhythm, normal S1 S2, no S3 or S4, no murmur, click or rub, no peripheral edema and peripheral pulses strong  ABDOMEN: soft, nontender, no hepatosplenomegaly, no masses and bowel sounds normal  MS: no gross musculoskeletal defects noted, no edema. Firm prominence over right, distal, lateral radius  SKIN: actinic keratosis on left nose bridge, right cheek, and above left lip.   NEURO: Normal strength and tone, mentation intact and speech normal  PSYCH: mentation appears normal, affect normal/bright    No results found for this or any previous visit (from the past 24 hour(s)).    ASSESSMENT/PLAN:       ICD-10-CM    1. Encounter for routine adult health examination without abnormal findings Z00.00    2. Visit for screening mammogram Z12.31    3. Screening for HIV (human immunodeficiency virus) Z11.4    4. Right wrist pain M25.531 XR Wrist Right G/E 3 Views     Actinic Keratosis:  PROCEDURE: Explained the risks and benefits of the procedure with the patient and obtained the patient's verbal consent. The area was treated with liquid nitrogen in freeze/thaw cycles x 3. Patient tolerated the procedure well.     Right Wrist Pain:  Obtained x-ray today which was " "unremarkable upon preliminary review, will wait for official read and call if there is anything of significance.     Health Maintenance:  Offered HIV screening, declined due to likely being screened in the past.      COUNSELING:  Reviewed preventive health counseling, as reflected in patient instructions    BP Readings from Last 1 Encounters:   04/25/19 110/64     Estimated body mass index is 22.67 kg/m  as calculated from the following:    Height as of this encounter: 1.6 m (5' 3\").    Weight as of this encounter: 58.1 kg (128 lb).       reports that she has never smoked. She has never used smokeless tobacco.      Counseling Resources:  ATP IV Guidelines  Pooled Cohorts Equation Calculator  Breast Cancer Risk Calculator  FRAX Risk Assessment  ICSI Preventive Guidelines  Dietary Guidelines for Americans, 2010  USDA's MyPlate  ASA Prophylaxis  Lung CA Screening    This document serves as a record of the services and decisions personally performed and made by Joya Rahman MD. It was created on her behalf by Viktor Lozada, a trained medical scribe. The creation of this document is based the provider's statements to the medical scribe.  Viktor Lozada, April 25, 2019 2:18 PM    The information in this document, created by the medical scribe for me, accurately reflects the services I personally performed and the decisions made by me. I have reviewed and approved this document for accuracy.   MD Joya Carter MD, MD  [facility]  "

## 2019-04-23 ENCOUNTER — ANCILLARY PROCEDURE (OUTPATIENT)
Dept: MAMMOGRAPHY | Facility: OTHER | Age: 54
End: 2019-04-23
Payer: COMMERCIAL

## 2019-04-23 DIAGNOSIS — Z12.31 VISIT FOR SCREENING MAMMOGRAM: ICD-10-CM

## 2019-04-23 PROCEDURE — 77067 SCR MAMMO BI INCL CAD: CPT | Mod: TC

## 2019-04-25 ENCOUNTER — OFFICE VISIT (OUTPATIENT)
Dept: FAMILY MEDICINE | Facility: OTHER | Age: 54
End: 2019-04-25
Payer: COMMERCIAL

## 2019-04-25 ENCOUNTER — ANCILLARY PROCEDURE (OUTPATIENT)
Dept: GENERAL RADIOLOGY | Facility: OTHER | Age: 54
End: 2019-04-25
Attending: FAMILY MEDICINE
Payer: COMMERCIAL

## 2019-04-25 VITALS
TEMPERATURE: 98.8 F | DIASTOLIC BLOOD PRESSURE: 64 MMHG | HEART RATE: 56 BPM | HEIGHT: 63 IN | RESPIRATION RATE: 12 BRPM | OXYGEN SATURATION: 100 % | BODY MASS INDEX: 22.68 KG/M2 | SYSTOLIC BLOOD PRESSURE: 110 MMHG | WEIGHT: 128 LBS

## 2019-04-25 DIAGNOSIS — M25.531 RIGHT WRIST PAIN: ICD-10-CM

## 2019-04-25 DIAGNOSIS — Z00.00 ENCOUNTER FOR ROUTINE ADULT HEALTH EXAMINATION WITHOUT ABNORMAL FINDINGS: Primary | ICD-10-CM

## 2019-04-25 DIAGNOSIS — Z12.31 VISIT FOR SCREENING MAMMOGRAM: ICD-10-CM

## 2019-04-25 DIAGNOSIS — L57.0 AK (ACTINIC KERATOSIS): ICD-10-CM

## 2019-04-25 DIAGNOSIS — Z11.4 SCREENING FOR HIV (HUMAN IMMUNODEFICIENCY VIRUS): ICD-10-CM

## 2019-04-25 PROCEDURE — 99396 PREV VISIT EST AGE 40-64: CPT | Mod: 25 | Performed by: FAMILY MEDICINE

## 2019-04-25 PROCEDURE — 99213 OFFICE O/P EST LOW 20 MIN: CPT | Mod: 25 | Performed by: FAMILY MEDICINE

## 2019-04-25 PROCEDURE — 73110 X-RAY EXAM OF WRIST: CPT | Mod: RT

## 2019-04-25 PROCEDURE — 17000 DESTRUCT PREMALG LESION: CPT | Performed by: FAMILY MEDICINE

## 2019-04-25 PROCEDURE — 17003 DESTRUCT PREMALG LES 2-14: CPT | Performed by: FAMILY MEDICINE

## 2019-04-25 ASSESSMENT — ENCOUNTER SYMPTOMS
JOINT SWELLING: 0
DYSURIA: 0
COUGH: 0
DIARRHEA: 0
FEVER: 0
DIZZINESS: 0
CHILLS: 0
PALPITATIONS: 0
HEMATURIA: 0
NERVOUS/ANXIOUS: 0
EYE PAIN: 0
WEAKNESS: 0
MYALGIAS: 0
SHORTNESS OF BREATH: 0
SORE THROAT: 0
CONSTIPATION: 0
HEADACHES: 0
NAUSEA: 0
HEARTBURN: 0
HEMATOCHEZIA: 0
FREQUENCY: 0
PARESTHESIAS: 0
ARTHRALGIAS: 0
ABDOMINAL PAIN: 0

## 2019-04-25 ASSESSMENT — MIFFLIN-ST. JEOR: SCORE: 1154.73

## 2019-04-25 NOTE — RESULT ENCOUNTER NOTE
Wrist Xray looks good. No concerning findings.  Please let me know if you have any questions.    Joya Rahman MD

## 2019-09-10 ENCOUNTER — OFFICE VISIT (OUTPATIENT)
Dept: PODIATRY | Facility: OTHER | Age: 54
End: 2019-09-10
Payer: COMMERCIAL

## 2019-09-10 ENCOUNTER — ANCILLARY PROCEDURE (OUTPATIENT)
Dept: GENERAL RADIOLOGY | Facility: OTHER | Age: 54
End: 2019-09-10
Attending: PODIATRIST
Payer: COMMERCIAL

## 2019-09-10 VITALS
WEIGHT: 127 LBS | BODY MASS INDEX: 22.5 KG/M2 | HEIGHT: 63 IN | SYSTOLIC BLOOD PRESSURE: 118 MMHG | DIASTOLIC BLOOD PRESSURE: 66 MMHG

## 2019-09-10 DIAGNOSIS — M92.61 SEVER'S APOPHYSITIS, BILATERAL: ICD-10-CM

## 2019-09-10 DIAGNOSIS — M92.62 SEVER'S APOPHYSITIS, BILATERAL: ICD-10-CM

## 2019-09-10 DIAGNOSIS — Q66.6 PES VALGUS: Primary | ICD-10-CM

## 2019-09-10 DIAGNOSIS — M79.672 LEFT FOOT PAIN: ICD-10-CM

## 2019-09-10 PROCEDURE — 73630 X-RAY EXAM OF FOOT: CPT | Mod: LT

## 2019-09-10 PROCEDURE — 99203 OFFICE O/P NEW LOW 30 MIN: CPT | Performed by: PODIATRIST

## 2019-09-10 ASSESSMENT — MIFFLIN-ST. JEOR: SCORE: 1150.2

## 2019-09-10 ASSESSMENT — PAIN SCALES - GENERAL: PAINLEVEL: MILD PAIN (2)

## 2019-09-10 NOTE — LETTER
9/10/2019         RE: Kristin Orta  [address]  [facility] [address]        Dear Colleague,    Thank you for referring your patient, Kristin Orta, to the [facility]. Please see a copy of my visit note below.    HPI:  Kristin Orta is a 53 year old female who is seen in consultation at the request of self.    Pt presents for eval of:   (Onset, Location, L/R, Character, Treatments, Injury if yes)    XR Left foot today 9/10/2019     Ongoing for 2 years, plantar and dorsal Left foot pain base of 2nd and 3rd toe and past few months pain is increases and more frequent. Presents today with flip flops.  Constant dull ache, Intermittent, burning, sharp, stabbing, pain 2 - 7  Worse if step on something uneven or walking barefoot    Works at a school.    Patient to follow up with Primary Care provider regarding elevated blood pressure.    ROS:  10 point ROS neg other than the symptoms noted above in the HPI.    Patient Active Problem List   Diagnosis     Irregular menstrual cycle     CARDIOVASCULAR SCREENING; LDL GOAL LESS THAN 160     Family history of nonmelanoma skin cancer       PAST MEDICAL HISTORY:   Past Medical History:   Diagnosis Date     Esophageal reflux 4/2004    ?     Unspecified constipation 4/2004    relieved with dietary changes     Unspecified contraceptive management         PAST SURGICAL HISTORY:   Past Surgical History:   Procedure Laterality Date     COLONOSCOPY WITH CO2 INSUFFLATION N/A 7/19/2017    Procedure: COLONOSCOPY WITH CO2 INSUFFLATION;  Dr. Joya Rahman/Colonscopy/Colonscreening/BMI:23.6/[facility] Fax: [phone];  Surgeon: Duane, William Charles, MD;  Location: [facility] OR     NO HISTORY OF SURGERY          MEDICATIONS:   Current Outpatient Medications:      multivitamin, therapeutic with minerals (MULTI-VITAMIN) TABS, Take 1 tablet by mouth daily, Disp: , Rfl:      VITAMIN D, CHOLECALCIFEROL, PO, Take 400 Units by mouth 2 times daily,  Disp: , Rfl:      ALLERGIES:    Allergies   Allergen Reactions     No Known Drug Allergies         SOCIAL HISTORY:   Social History     Socioeconomic History     Marital status:      Spouse name: Tamir     Number of children: 3     Years of education: 14     Highest education level: Not on file   Occupational History     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   Social Needs     Financial resource strain: Not on file     Food insecurity:     Worry: Not on file     Inability: Not on file     Transportation needs:     Medical: Not on file     Non-medical: Not on file   Tobacco Use     Smoking status: Never Smoker     Smokeless tobacco: Never Used     Tobacco comment: No smokers in home   Substance and Sexual Activity     Alcohol use: Yes     Comment: 2 drinks per month     Drug use: No     Sexual activity: Yes     Partners: Male     Birth control/protection: Surgical     Comment: spouse had vas - also wants BCP   Lifestyle     Physical activity:     Days per week: Not on file     Minutes per session: Not on file     Stress: Not on file   Relationships     Social connections:     Talks on phone: Not on file     Gets together: Not on file     Attends Scientology service: Not on file     Active member of club or organization: Not on file     Attends meetings of clubs or organizations: Not on file     Relationship status: Not on file     Intimate partner violence:     Fear of current or ex partner: Not on file     Emotionally abused: Not on file     Physically abused: Not on file     Forced sexual activity: Not on file   Other Topics Concern      Service No     Blood Transfusions No     Caffeine Concern No     Occupational Exposure No     Hobby Hazards No     Sleep Concern No     Stress Concern No     Weight Concern No     Special Diet No     Comment: fruits/vegetables daily     Back Care No     Exercise No     Comment: 1.5 hours exercises day - aerobic and strength     Bike Helmet No     Seat Belt No      "Self-Exams Yes     Comment: know BSE , ocassional BSE     Parent/sibling w/ CABG, MI or angioplasty before 65F 55M? No   Social History Narrative    Lives with spouse, 1 child in middle school and 1 college student, 1 out of college and lives at home. No domestic violence issues.        FAMILY HISTORY:   Family History   Problem Relation Age of Onset     Cancer Mother         leukemia -  age 32     Lipids Father      Eye Disorder Father         cataract     Breast Cancer Maternal Grandmother         dx 70s     Heart Disease Paternal Grandmother         CHF     Cancer - colorectal Paternal Grandfather          dx 90        EXAM:Vitals: /66   Ht 1.6 m (5' 3\")   Wt 57.6 kg (127 lb)   BMI 22.50 kg/m     BMI= Body mass index is 22.5 kg/m .    General appearance: Patient is alert and fully cooperative with history & exam.  No sign of distress is noted during the visit.     Psychiatric: Affect is pleasant & appropriate.  Patient appears motivated to improve health.     Respiratory: Breathing is regular & unlabored while sitting.     HEENT: Hearing is intact to spoken word.  Speech is clear.  No gross evidence of visual impairment that would impact ambulation.     Vascular: DP & PT pulses are intact & regular bilaterally.  No significant edema or varicosities noted.  CFT and skin temperature is normal to both lower extremities.     Neurologic: Lower extremity sensation is intact to light touch.  No evidence of weakness or contracture in the lower extremities.  No evidence of neuropathy.    Dermatologic: Skin is intact to both lower extremities with adequate texture, turgor and tone about the integument.  No paronychia or evidence of soft tissue infection is noted.     Musculoskeletal: Patient is ambulatory without assistive device or brace.  Mild forefoot valgus is noted bilateral.  Second third metatarsal heads are quite prominent plantarly when compared to first and fourth.  No pain throughout range of motion " of the metatarsal phalangeal joints Lisfranc midtarsal subtalar ankle joint range of motion.  Extensor tendons are fairly tight however she does have far more than 0 degrees of ankle joint dorsiflexion noted.  No crepitus or tracking through range of motion of the ankle subtalar midtarsal metatarsal phalangeal joints.  Generalized discomfort only under the plantar second metatarsal head and no palpable induration.  Negative drawer maneuver of the second MPJ.  Mild contracture and very long lesser toes.    Radiographs: 3 views left foot demonstrates long second metatarsal abnormal metatarsal length parabola.  No medial or lateral deviation of the second digit at the MPJ.  No flattening of the second metatarsal head.  No acute cortical reaction.  No joint diastases.    Radiographs:     ASSESSMENT:       ICD-10-CM    1. Pes valgus Q66.6    2. Sever's apophysitis, bilateral M92.8         PLAN:  Reviewed patient's chart in [facility].      9/10/2019   Obtained interpreted radiographs  Likely has some fat pad atrophy across the forefoot.  Has mild valgus that contributes to overuse of the second metatarsal phalangeal joint.  Recommended moving away from flexible or less supportive shoe gear.  Recommend more arch height to redistribute load from the heel and ball of the foot into the arch as well.  We discussed custom molded orthotics but she would like to attempt changes in shoe gear first.  We discussed utilizing oral anti-inflammatories and injections about the second MPJ if this were swollen or severely limiting her physical activities however at this time she refuses oral anti-inflammatories as well.  We discussed surgical options after exhausting all conservative options and remaining symptomatic.  Discussed appropriate shoe gear and written instructions dispensed.  All questions were answered to her satisfaction she will follow-up in the next month or 2 with any continued symptoms after attempting changes in shoe  gear.    Jorge Luis Fowler DPM          Again, thank you for allowing me to participate in the care of your patient.        Sincerely,        Jorge Luis Fowler DPM

## 2019-09-10 NOTE — PROGRESS NOTES
HPI:  Kristin Orta is a 53 year old female who is seen in consultation at the request of self.    Pt presents for eval of:   (Onset, Location, L/R, Character, Treatments, Injury if yes)    XR Left foot today 9/10/2019     Ongoing for 2 years, plantar and dorsal Left foot pain base of 2nd and 3rd toe and past few months pain is increases and more frequent. Presents today with flip flops.  Constant dull ache, Intermittent, burning, sharp, stabbing, pain 2 - 7  Worse if step on something uneven or walking barefoot    Works at a school.    Patient to follow up with Primary Care provider regarding elevated blood pressure.    ROS:  10 point ROS neg other than the symptoms noted above in the HPI.    Patient Active Problem List   Diagnosis     Irregular menstrual cycle     CARDIOVASCULAR SCREENING; LDL GOAL LESS THAN 160     Family history of nonmelanoma skin cancer       PAST MEDICAL HISTORY:   Past Medical History:   Diagnosis Date     Esophageal reflux 4/2004    ?     Unspecified constipation 4/2004    relieved with dietary changes     Unspecified contraceptive management         PAST SURGICAL HISTORY:   Past Surgical History:   Procedure Laterality Date     COLONOSCOPY WITH CO2 INSUFFLATION N/A 7/19/2017    Procedure: COLONOSCOPY WITH CO2 INSUFFLATION;  Dr. Joya Rahman/Colonscopy/Colonscreening/BMI:23.6/[facility] Fax: [phone];  Surgeon: Duane, William Charles, MD;  Location: [facility] OR     NO HISTORY OF SURGERY          MEDICATIONS:   Current Outpatient Medications:      multivitamin, therapeutic with minerals (MULTI-VITAMIN) TABS, Take 1 tablet by mouth daily, Disp: , Rfl:      VITAMIN D, CHOLECALCIFEROL, PO, Take 400 Units by mouth 2 times daily, Disp: , Rfl:      ALLERGIES:    Allergies   Allergen Reactions     No Known Drug Allergies         SOCIAL HISTORY:   Social History     Socioeconomic History     Marital status:      Spouse name: Tamir     Number of children: 3     Years of education: 14      Highest education level: Not on file   Occupational History     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   Social Needs     Financial resource strain: Not on file     Food insecurity:     Worry: Not on file     Inability: Not on file     Transportation needs:     Medical: Not on file     Non-medical: Not on file   Tobacco Use     Smoking status: Never Smoker     Smokeless tobacco: Never Used     Tobacco comment: No smokers in home   Substance and Sexual Activity     Alcohol use: Yes     Comment: 2 drinks per month     Drug use: No     Sexual activity: Yes     Partners: Male     Birth control/protection: Surgical     Comment: spouse had vas - also wants BCP   Lifestyle     Physical activity:     Days per week: Not on file     Minutes per session: Not on file     Stress: Not on file   Relationships     Social connections:     Talks on phone: Not on file     Gets together: Not on file     Attends Mosque service: Not on file     Active member of club or organization: Not on file     Attends meetings of clubs or organizations: Not on file     Relationship status: Not on file     Intimate partner violence:     Fear of current or ex partner: Not on file     Emotionally abused: Not on file     Physically abused: Not on file     Forced sexual activity: Not on file   Other Topics Concern      Service No     Blood Transfusions No     Caffeine Concern No     Occupational Exposure No     Hobby Hazards No     Sleep Concern No     Stress Concern No     Weight Concern No     Special Diet No     Comment: fruits/vegetables daily     Back Care No     Exercise No     Comment: 1.5 hours exercises day - aerobic and strength     Bike Helmet No     Seat Belt No     Self-Exams Yes     Comment: know BSE , ocassional BSE     Parent/sibling w/ CABG, MI or angioplasty before 65F 55M? No   Social History Narrative    Lives with spouse, 1 child in middle school and 1 college student, 1 out of college and lives at home. No domestic  "violence issues.        FAMILY HISTORY:   Family History   Problem Relation Age of Onset     Cancer Mother         leukemia -  age 32     Lipids Father      Eye Disorder Father         cataract     Breast Cancer Maternal Grandmother         dx 70s     Heart Disease Paternal Grandmother         CHF     Cancer - colorectal Paternal Grandfather          dx 90        EXAM:Vitals: /66   Ht 1.6 m (5' 3\")   Wt 57.6 kg (127 lb)   BMI 22.50 kg/m    BMI= Body mass index is 22.5 kg/m .    General appearance: Patient is alert and fully cooperative with history & exam.  No sign of distress is noted during the visit.     Psychiatric: Affect is pleasant & appropriate.  Patient appears motivated to improve health.     Respiratory: Breathing is regular & unlabored while sitting.     HEENT: Hearing is intact to spoken word.  Speech is clear.  No gross evidence of visual impairment that would impact ambulation.     Vascular: DP & PT pulses are intact & regular bilaterally.  No significant edema or varicosities noted.  CFT and skin temperature is normal to both lower extremities.     Neurologic: Lower extremity sensation is intact to light touch.  No evidence of weakness or contracture in the lower extremities.  No evidence of neuropathy.    Dermatologic: Skin is intact to both lower extremities with adequate texture, turgor and tone about the integument.  No paronychia or evidence of soft tissue infection is noted.     Musculoskeletal: Patient is ambulatory without assistive device or brace.  Mild forefoot valgus is noted bilateral.  Second third metatarsal heads are quite prominent plantarly when compared to first and fourth.  No pain throughout range of motion of the metatarsal phalangeal joints Lisfranc midtarsal subtalar ankle joint range of motion.  Extensor tendons are fairly tight however she does have far more than 0 degrees of ankle joint dorsiflexion noted.  No crepitus or tracking through range of motion of the " ankle subtalar midtarsal metatarsal phalangeal joints.  Generalized discomfort only under the plantar second metatarsal head and no palpable induration.  Negative drawer maneuver of the second MPJ.  Mild contracture and very long lesser toes.    Radiographs: 3 views left foot demonstrates long second metatarsal abnormal metatarsal length parabola.  No medial or lateral deviation of the second digit at the MPJ.  No flattening of the second metatarsal head.  No acute cortical reaction.  No joint diastases.    Radiographs:     ASSESSMENT:       ICD-10-CM    1. Pes valgus Q66.6    2. Sever's apophysitis, bilateral M92.8         PLAN:  Reviewed patient's chart in [facility].      9/10/2019   Obtained interpreted radiographs  Likely has some fat pad atrophy across the forefoot.  Has mild valgus that contributes to overuse of the second metatarsal phalangeal joint.  Recommended moving away from flexible or less supportive shoe gear.  Recommend more arch height to redistribute load from the heel and ball of the foot into the arch as well.  We discussed custom molded orthotics but she would like to attempt changes in shoe gear first.  We discussed utilizing oral anti-inflammatories and injections about the second MPJ if this were swollen or severely limiting her physical activities however at this time she refuses oral anti-inflammatories as well.  We discussed surgical options after exhausting all conservative options and remaining symptomatic.  Discussed appropriate shoe gear and written instructions dispensed.  All questions were answered to her satisfaction she will follow-up in the next month or 2 with any continued symptoms after attempting changes in shoe gear.    Jorge Luis Fowler DPM

## 2019-09-10 NOTE — PATIENT INSTRUCTIONS
Reliable shoe stores: To maximize your experience and provide the best possible fit.  Be sure to show them your foot concerns and tell them Dr. Fowler sent you.      Stores listed in bold have only athletic shoes, and stores that are not bold are mostly casual or variety of shoes    [employer]  [address] [place] [address]  [phone]    [employer] [place]  [address] [place] [address]  [phone]     [employer]  [address] [place] [address]  [phone]    [employer]  [address] [facility]  [facility] [address]  [phone]    [employer]  [address] [place], [place] [address] [phone]    [employer]  [address] [place] [address]  [phone]                         [employer] Locations:     [address] [place] [address]   [phone]     [address] [place] [address] [address] [place] [address]   [phone]     [address] [place] [address]   [phone]     [address] [facility] [place] [address]   [phone]     [address] [place] [address]   [phone]     [address] [place] [address]   [phone]     [address] [facility] [address]   [place] [address]   [phone]     [address] [facility]   [place] [address]   [phone]             How to find reasonable shoes          The correct width    Correct Fitting    Correct Length      Foot Distortion    Posture Distortion                          Torsional Rigidity      Grasp behind the heel and underneath the foot and twist      Bad    Excessive torsion/twist in midfoot     Less torsion/twist in midfoot is better                   Heel Counter Rigidity      Grasp just above   midsole and squeeze      Bad    Soft heel counter      Good    Rigid Heel Counter      Flexion Rigidity      Grasp shoe and bend from forefoot to rearfoot

## 2019-09-27 ENCOUNTER — HEALTH MAINTENANCE LETTER (OUTPATIENT)
Age: 54
End: 2019-09-27

## 2019-10-04 NOTE — TELEPHONE ENCOUNTER
RECORDS RECEIVED FROM: L foot pain, ball of foot, no outside records, appt per pt   DATE RECEIVED: Nov 26, 2019    NOTES STATUS DETAILS   OFFICE NOTE from referring provider Internal Dr Fowler 9/10/19   OFFICE NOTE from other specialist N/A    DISCHARGE SUMMARY from hospital N/A    DISCHARGE REPORT from the ER N/A    OPERATIVE REPORT N/A    MEDICATION LIST Internal    IMPLANT RECORD/STICKER N/A    LABS     CBC/DIFF N/A    CULTURES N/A    INJECTIONS DONE IN RADIOLOGY N/A    MRI N/A    CT SCAN N/A    XRAYS (IMAGES & REPORTS) Internal 9/10/19   TUMOR     PATHOLOGY  Slides & report N/A

## 2019-11-26 ENCOUNTER — OFFICE VISIT (OUTPATIENT)
Dept: ORTHOPEDICS | Facility: CLINIC | Age: 54
End: 2019-11-26
Payer: COMMERCIAL

## 2019-11-26 ENCOUNTER — PRE VISIT (OUTPATIENT)
Dept: ORTHOPEDICS | Facility: CLINIC | Age: 54
End: 2019-11-26

## 2019-11-26 DIAGNOSIS — G89.29 CHRONIC FOOT PAIN, LEFT: Primary | ICD-10-CM

## 2019-11-26 DIAGNOSIS — M79.672 CHRONIC FOOT PAIN, LEFT: Primary | ICD-10-CM

## 2019-11-26 NOTE — PROGRESS NOTES
HISTORY OF PRESENT ILLNESS  Ms. Jose Orta is a pleasant 53 year old year old female who presents to clinic today with left foot pain  Kristin explains that her foot has bothered her more over the past months with walking  Location: left foot  Quality:  achy pain    Severity: 7/10    Duration: months worse  Timing: occurs intermittently  Context: occurs while exercising and lifting  Modifying factors:  resting and non-use makes it better, movement and use makes it worse  Associated signs & symptoms: swelling in foot  Exercise: lifting weights and cardiovascular on machine  Additional history: as documented    MEDICAL HISTORY  Patient Active Problem List   Diagnosis     Irregular menstrual cycle     CARDIOVASCULAR SCREENING; LDL GOAL LESS THAN 160     Family history of nonmelanoma skin cancer       Current Outpatient Medications   Medication Sig Dispense Refill     multivitamin, therapeutic with minerals (MULTI-VITAMIN) TABS Take 1 tablet by mouth daily       VITAMIN D, CHOLECALCIFEROL, PO Take 400 Units by mouth 2 times daily         Allergies   Allergen Reactions     No Known Drug Allergies        Family History   Problem Relation Age of Onset     Cancer Mother         leukemia -  age 32     Lipids Father      Eye Disorder Father         cataract     Breast Cancer Maternal Grandmother         dx 70s     Heart Disease Paternal Grandmother         CHF     Cancer - colorectal Paternal Grandfather          dx 90       Additional medical/Social/Surgical histories reviewed in [facility] and updated as appropriate.     REVIEW OF SYSTEMS (2019)  10 point ROS of systems including Constitutional, Eyes, Respiratory, Cardiovascular, Gastroenterology, Genitourinary, Integumentary, Musculoskeletal, Psychiatric were all negative except for pertinent positives noted in my HPI.     PHYSICAL EXAM  VSS    General  - normal appearance, in no obvious distress  CV  - normal pulses at posterior tib and dorsalis pedis  Pulm  -  normal respiratory pattern, non-labored  Musculoskeletal -left foot  - stance: gait slightly favors affected side, not reluctant to bear weight  - inspection: no moderate swelling laterally, normal bone and joint alignment, no obvious deformity  - palpation: tenderness over distal metatarsalsL, no tenderness over lateral or medial malleoli, navicular, or base of 5th MT  - ROM: intact globally but not limited secondary to pain  - strength: 4/5 in eversion, 5/5 in all other planes  - special tests:  pain with inversion stress  (-) anterior drawer  (-) talar tilt  (-) Tinel's  (-) squeeze test  (-) Bravo test  Neuro  - no sensory or motor deficit, grossly normal coordination, normal muscle tone  Skin  - no ecchymosis overlying lateral foot-ankle junction, no warmth or induration, no obvious rash  Psych  - interactive, appropriate, normal mood and affect      ASSESSMENT & PLAN  53 yo female with left foot pain chronic, due to possible stress fracture  Ordered left foot MRI  Can use supportive shoes    Adi Minor MD, CAQSM  Answers for HPI/ROS submitted by the patient on 11/26/2019   General Symptoms: No  Skin Symptoms: No  HENT Symptoms: No  EYE SYMPTOMS: No  HEART SYMPTOMS: No  LUNG SYMPTOMS: No  INTESTINAL SYMPTOMS: No  URINARY SYMPTOMS: No  GYNECOLOGIC SYMPTOMS: No  BREAST SYMPTOMS: No  SKELETAL SYMPTOMS: No  BLOOD SYMPTOMS: No  NERVOUS SYSTEM SYMPTOMS: No  MENTAL HEALTH SYMPTOMS: No

## 2019-11-26 NOTE — LETTER
2019       RE: Kristin Orta  [address] [facility] [address]     Dear Colleague,    Thank you for referring your patient, Kristin Orta, to the [facility] ORTHOPAEDIC CLINIC at [facility]. Please see a copy of my visit note below.    HISTORY OF PRESENT ILLNESS  Ms. Jose Orta is a pleasant 53 year old year old female who presents to clinic today with left foot pain  Kristin explains that her foot has bothered her more over the past months with walking  Location: left foot  Quality:  achy pain    Severity: 7/10    Duration: months worse  Timing: occurs intermittently  Context: occurs while exercising and lifting  Modifying factors:  resting and non-use makes it better, movement and use makes it worse  Associated signs & symptoms: swelling in foot  Exercise: lifting weights and cardiovascular on machine  Additional history: as documented    MEDICAL HISTORY  Patient Active Problem List   Diagnosis     Irregular menstrual cycle     CARDIOVASCULAR SCREENING; LDL GOAL LESS THAN 160     Family history of nonmelanoma skin cancer       Current Outpatient Medications   Medication Sig Dispense Refill     multivitamin, therapeutic with minerals (MULTI-VITAMIN) TABS Take 1 tablet by mouth daily       VITAMIN D, CHOLECALCIFEROL, PO Take 400 Units by mouth 2 times daily         Allergies   Allergen Reactions     No Known Drug Allergies        Family History   Problem Relation Age of Onset     Cancer Mother         leukemia -  age 32     Lipids Father      Eye Disorder Father         cataract     Breast Cancer Maternal Grandmother         dx 70s     Heart Disease Paternal Grandmother         CHF     Cancer - colorectal Paternal Grandfather          dx 90       Additional medical/Social/Surgical histories reviewed in [facility] and updated as appropriate.     REVIEW OF SYSTEMS (2019)  10 point ROS of systems including Constitutional, Eyes, Respiratory,  Cardiovascular, Gastroenterology, Genitourinary, Integumentary, Musculoskeletal, Psychiatric were all negative except for pertinent positives noted in my HPI.     PHYSICAL EXAM  VSS    General  - normal appearance, in no obvious distress  CV  - normal pulses at posterior tib and dorsalis pedis  Pulm  - normal respiratory pattern, non-labored  Musculoskeletal -left foot  - stance: gait slightly favors affected side, not reluctant to bear weight  - inspection: no moderate swelling laterally, normal bone and joint alignment, no obvious deformity  - palpation: tenderness over distal metatarsalsL, no tenderness over lateral or medial malleoli, navicular, or base of 5th MT  - ROM: intact globally but not limited secondary to pain  - strength: 4/5 in eversion, 5/5 in all other planes  - special tests:  pain with inversion stress  (-) anterior drawer  (-) talar tilt  (-) Tinel's  (-) squeeze test  (-) Bravo test  Neuro  - no sensory or motor deficit, grossly normal coordination, normal muscle tone  Skin  - no ecchymosis overlying lateral foot-ankle junction, no warmth or induration, no obvious rash  Psych  - interactive, appropriate, normal mood and affect      ASSESSMENT & PLAN  55 yo female with left foot pain chronic, due to possible stress fracture  Ordered left foot MRI  Can use supportive shoes    Again, thank you for allowing me to participate in the care of your patient.      Sincerely,    Adi Minor MD

## 2019-11-26 NOTE — NURSING NOTE
Reason For Visit:   Chief Complaint   Patient presents with     Consult     L Foot Pain, ball of foot       There were no vitals taken for this visit.    Pain Assessment  Patient Currently in Pain: Yes  0-10 Pain Scale: 5  Primary Pain Location: Foot  Other Pain Locations: balls of foot  Pain Descriptors: Alireza Tamez ATC

## 2019-11-27 ENCOUNTER — OFFICE VISIT (OUTPATIENT)
Dept: DERMATOLOGY | Facility: CLINIC | Age: 54
End: 2019-11-27
Payer: COMMERCIAL

## 2019-11-27 DIAGNOSIS — Z12.83 SKIN CANCER SCREENING: ICD-10-CM

## 2019-11-27 DIAGNOSIS — D22.9 MULTIPLE BENIGN NEVI: ICD-10-CM

## 2019-11-27 DIAGNOSIS — L81.4 LENTIGINES: Primary | ICD-10-CM

## 2019-11-27 PROCEDURE — 99213 OFFICE O/P EST LOW 20 MIN: CPT | Performed by: PHYSICIAN ASSISTANT

## 2019-11-27 ASSESSMENT — PAIN SCALES - GENERAL: PAINLEVEL: NO PAIN (0)

## 2019-11-27 NOTE — NURSING NOTE
Kristin Orta's goals for this visit include:   Chief Complaint   Patient presents with     Skin Check     Has not seen derm before. no personal or family hx SC. Not immunosuppressed. Areas of concern: AK's cryo'd last year in family practice. Recheck spots       She requests these members of her care team be copied on today's visit information: no    PCP: Joya Rahman    Referring Provider:  No referring provider defined for this encounter.    There were no vitals taken for this visit.    Do you need any medication refills at today's visit? No  Clarice Sands LPN

## 2019-11-27 NOTE — LETTER
11/27/2019         RE: Kristin Orta  [address] [facility] [address]        Dear Colleague,    Thank you for referring your patient, Kristin Orta, to the [facility]. Please see a copy of my visit note below.    [facility] Dermatology Note      Dermatology Problem List:  1. Solar lentigines    Encounter Date: Nov 27, 2019    CC:  Chief Complaint   Patient presents with     Skin Check     Has not seen derm before. no personal or family hx SC. Not immunosuppressed. Areas of concern: AK's cryo'd last year in family practice. Recheck spots         History of Present Illness:  Ms. Kristin Orta is a 53 year old female who is new to the clinic and presents for a skin check. At today's visit, the patient notes some lesions of concern on her face that were previously treated with cryotherapy in April of 2019 and diagnosed as actinic keratoses by her PCP. She would like her face rechecked today to make sure the lesions have resolved. The patient denies a personal or family history of skin cancer. The patient denies additional lesions or areas of concern. The patient denies painful, itching, tingling or bleeding lesions unless otherwise noted.    Past Medical History:   Patient Active Problem List   Diagnosis     Irregular menstrual cycle     CARDIOVASCULAR SCREENING; LDL GOAL LESS THAN 160     Family history of nonmelanoma skin cancer     Past Medical History:   Diagnosis Date     Esophageal reflux 4/2004    ?     Unspecified constipation 4/2004    relieved with dietary changes     Unspecified contraceptive management      Past Surgical History:   Procedure Laterality Date     COLONOSCOPY WITH CO2 INSUFFLATION N/A 7/19/2017    Procedure: COLONOSCOPY WITH CO2 INSUFFLATION;  Dr. Joya Rahman/Colonscopy/Colonscreening/BMI:23.6/Marlin Duarte Fax: [phone];  Surgeon: Duane, William Charles, MD;  Location: [facility] OR     NO HISTORY OF SURGERY          Social History:   reports that she has never smoked. She has never used smokeless tobacco. She reports current alcohol use. She reports that she does not use drugs.    Family History:  Family History   Problem Relation Age of Onset     Cancer Mother         leukemia -  age 32     Lipids Father      Eye Disorder Father         cataract     Breast Cancer Maternal Grandmother         dx 70s     Heart Disease Paternal Grandmother         CHF     Cancer - colorectal Paternal Grandfather          dx 90       Medications:  Current Outpatient Medications   Medication Sig Dispense Refill     multivitamin, therapeutic with minerals (MULTI-VITAMIN) TABS Take 1 tablet by mouth daily       VITAMIN D, CHOLECALCIFEROL, PO Take 400 Units by mouth 2 times daily         Allergies   Allergen Reactions     No Known Drug Allergies        Review of Systems:  -Constitutional: The patient denies fatigue, fevers, chills, unintended weight loss, and night sweats.  -HEENT: Patient denies nonhealing oral sores.  -Skin: As above in HPI. No additional skin concerns.    Physical exam:  Vitals: There were no vitals taken for this visit.  GEN: This is a well developed, well-nourished female in no acute distress, in a pleasant mood.   SKIN: Full skin, which includes the head/face, both arms, chest, back, abdomen,both legs, genitalia and/or groin buttocks, digits and/or nails, was examined.  -Scattered brown macules on the face.  -Armstrong's skin type II, less than 100 nevi  -No other lesions of concern on areas examined.       Impression/Plan:  1. Skin cancer screening/Multiple benign appearing nevi    ABCDs of melanoma were discussed and self skin checks were advised.    Sun precaution was advised including the use of sun screens of SPF 30 or higher, sun protective clothing, and avoidance of tanning beds.    Provided sunscreen, melanoma and sun protective clothing handouts    Recommended SkinCeuticals and EltaMD sunscreens    Patient  to return for yearly skin checks    2. Solar lentigines     Educated on the etiology    Reassured benign    No further intervention required. Patient to report changes.     CC Dr. Lucas on close of this encounter.  Follow-up in 1 year, earlier for new or changing lesions.       Staff Involved:  Staff/Scribe    Scribe Disclosure:  I, Jonah Garcia, am serving as a scribe to document services personally performed by Alla Harman PA-C, based on data collection and the provider's statements to me.      Provider Disclosure:   The documentation recorded by the scribe accurately reflects the services I personally performed and the decisions made by me.    All risks, benefits and alternatives were discussed with patient.  Patient is in agreement and understands the assessment and plan.  All questions were answered.    Alla Harman PA-C  [facility]: Phone: [phone], Fax: [phone]                      Again, thank you for allowing me to participate in the care of your patient.        Sincerely,        Alla Harman PA-C

## 2019-11-27 NOTE — PATIENT INSTRUCTIONS
Sun protective clothing and Resources     Lands End (www.[employer]Ex Media.com)  Athleta (www.[email or web address].[employer])  Moni Life (www.[email or web address] Media Networks.[employer])  Carve Designs ([employer]) - affordable  Skinz (uvskinz.com)    Long sleeve - Mckayla Cool DRI UPF 50 or Jarrell PFG UPF 50  Hoodie - Jarrell PFG UPF 50  Swimshirt/Rash Guard - Tamar UPF 50 (on Amazon)  Neck - Outdoor Research Ubertubes (www.outdoorresLewis Tank Transport.com)  The ABCDEs of Melanoma    Skin cancer can develop anywhere on the skin. Ask someone for help when checking your skin, especially in hard to see places. If you notice a mole different from others, or that changes, enlarges, itches, or bleeds (even if it is small), you should see a dermatologist.             Sunscreen recommendations:    EltaMD  SkinCeuticals UV Fusion

## 2019-11-27 NOTE — PROGRESS NOTES
[facility] Dermatology Note      Dermatology Problem List:  1. Solar lentigines    Encounter Date: Nov 27, 2019    CC:  Chief Complaint   Patient presents with     Skin Check     Has not seen derm before. no personal or family hx SC. Not immunosuppressed. Areas of concern: AK's cryo'd last year in family practice. Recheck spots         History of Present Illness:  Ms. Kristin Orta is a 53 year old female who is new to the clinic and presents for a skin check. At today's visit, the patient notes some lesions of concern on her face that were previously treated with cryotherapy in April of 2019 and diagnosed as actinic keratoses by her PCP. She would like her face rechecked today to make sure the lesions have resolved. The patient denies a personal or family history of skin cancer. The patient denies additional lesions or areas of concern. The patient denies painful, itching, tingling or bleeding lesions unless otherwise noted.    Past Medical History:   Patient Active Problem List   Diagnosis     Irregular menstrual cycle     CARDIOVASCULAR SCREENING; LDL GOAL LESS THAN 160     Family history of nonmelanoma skin cancer     Past Medical History:   Diagnosis Date     Esophageal reflux 4/2004    ?     Unspecified constipation 4/2004    relieved with dietary changes     Unspecified contraceptive management      Past Surgical History:   Procedure Laterality Date     COLONOSCOPY WITH CO2 INSUFFLATION N/A 7/19/2017    Procedure: COLONOSCOPY WITH CO2 INSUFFLATION;  Dr. Joya Rahman/Colonscopy/Colonscreening/BMI:23.6/[place] Fax: [phone];  Surgeon: Duane, William Charles, MD;  Location:  OR     NO HISTORY OF SURGERY         Social History:   reports that she has never smoked. She has never used smokeless tobacco. She reports current alcohol use. She reports that she does not use drugs.    Family History:  Family History   Problem Relation Age of Onset     Cancer Mother         leukemia -   age 32     Lipids Father      Eye Disorder Father         cataract     Breast Cancer Maternal Grandmother         dx 70s     Heart Disease Paternal Grandmother         CHF     Cancer - colorectal Paternal Grandfather          dx 90       Medications:  Current Outpatient Medications   Medication Sig Dispense Refill     multivitamin, therapeutic with minerals (MULTI-VITAMIN) TABS Take 1 tablet by mouth daily       VITAMIN D, CHOLECALCIFEROL, PO Take 400 Units by mouth 2 times daily         Allergies   Allergen Reactions     No Known Drug Allergies        Review of Systems:  -Constitutional: The patient denies fatigue, fevers, chills, unintended weight loss, and night sweats.  -HEENT: Patient denies nonhealing oral sores.  -Skin: As above in HPI. No additional skin concerns.    Physical exam:  Vitals: There were no vitals taken for this visit.  GEN: This is a well developed, well-nourished female in no acute distress, in a pleasant mood.   SKIN: Full skin, which includes the head/face, both arms, chest, back, abdomen,both legs, genitalia and/or groin buttocks, digits and/or nails, was examined.  -Scattered brown macules on the face.  -Armstrong's skin type II, less than 100 nevi  -No other lesions of concern on areas examined.       Impression/Plan:  1. Skin cancer screening/Multiple benign appearing nevi    ABCDs of melanoma were discussed and self skin checks were advised.    Sun precaution was advised including the use of sun screens of SPF 30 or higher, sun protective clothing, and avoidance of tanning beds.    Provided sunscreen, melanoma and sun protective clothing handouts    Recommended SkinCeuticals and EltaMD sunscreens    Patient to return for yearly skin checks    2. Solar lentigines     Educated on the etiology    Reassured benign    No further intervention required. Patient to report changes.     CC Dr. Lucas on close of this encounter.  Follow-up in 1 year, earlier for new or changing lesions.        Staff Involved:  Staff/Scribe    Scribe Disclosure:  I, Jonah Garcia, am serving as a scribe to document services personally performed by Alla Harman PA-C, based on data collection and the provider's statements to me.      Provider Disclosure:   The documentation recorded by the scribe accurately reflects the services I personally performed and the decisions made by me.    All risks, benefits and alternatives were discussed with patient.  Patient is in agreement and understands the assessment and plan.  All questions were answered.    lAla Harman PA-C  [facility]: Phone: [phone], Fax: [phone]

## 2019-12-03 ENCOUNTER — ANCILLARY PROCEDURE (OUTPATIENT)
Dept: MRI IMAGING | Facility: CLINIC | Age: 54
End: 2019-12-03
Attending: PREVENTIVE MEDICINE
Payer: COMMERCIAL

## 2019-12-03 DIAGNOSIS — M79.672 CHRONIC FOOT PAIN, LEFT: ICD-10-CM

## 2019-12-03 DIAGNOSIS — G89.29 CHRONIC FOOT PAIN, LEFT: ICD-10-CM

## 2019-12-03 PROCEDURE — 73718 MRI LOWER EXTREMITY W/O DYE: CPT | Mod: LT | Performed by: RADIOLOGY

## 2019-12-20 ENCOUNTER — OFFICE VISIT (OUTPATIENT)
Dept: ORTHOPEDICS | Facility: CLINIC | Age: 54
End: 2019-12-20
Payer: COMMERCIAL

## 2019-12-20 DIAGNOSIS — M84.375D STRESS FRACTURE OF METATARSAL BONE OF LEFT FOOT WITH ROUTINE HEALING, SUBSEQUENT ENCOUNTER: ICD-10-CM

## 2019-12-20 DIAGNOSIS — M77.42 METATARSALGIA OF LEFT FOOT: Primary | ICD-10-CM

## 2019-12-20 RX ORDER — DICLOFENAC SODIUM 75 MG/1
75 TABLET, DELAYED RELEASE ORAL 2 TIMES DAILY
Qty: 40 TABLET | Refills: 1 | Status: SHIPPED | OUTPATIENT
Start: 2019-12-20 | End: 2020-02-02

## 2019-12-20 NOTE — NURSING NOTE
Reason For Visit:   Chief Complaint   Patient presents with     RECHECK     FU MRI - L foot pain       There were no vitals taken for this visit.    Pain Assessment  Patient Currently in Pain: Denies  Primary Pain Location: Foot  Other Pain Locations: if not wearing shoes it's a 8. none when sitting. pain only with walking.     Mendy Tamez ATC

## 2019-12-20 NOTE — LETTER
2019       RE: Kristin Orta  [address]  [facility] [address]     Dear Colleague,    Thank you for referring your patient, Kristin Orta, to the [facility] ORTHOPAEDIC CLINIC at [facility]. Please see a copy of my visit note below.    HISTORY OF PRESENT ILLNESS  Ms. Jose Orta is a pleasant 54 year old year old female who presents to clinic today left foot pain  Had recent MRI of foot  Feeling a little better  Has some pain with walking and standing a lot    MEDICAL HISTORY  Patient Active Problem List   Diagnosis     Irregular menstrual cycle     CARDIOVASCULAR SCREENING; LDL GOAL LESS THAN 160     Family history of nonmelanoma skin cancer       Current Outpatient Medications   Medication Sig Dispense Refill     diclofenac (VOLTAREN) 75 MG EC tablet Take 1 tablet (75 mg) by mouth 2 times daily 40 tablet 1     multivitamin, therapeutic with minerals (MULTI-VITAMIN) TABS Take 1 tablet by mouth daily       VITAMIN D, CHOLECALCIFEROL, PO Take 400 Units by mouth 2 times daily         Allergies   Allergen Reactions     No Known Drug Allergies        Family History   Problem Relation Age of Onset     Cancer Mother         leukemia -  age 32     Lipids Father      Eye Disorder Father         cataract     Breast Cancer Maternal Grandmother         dx 70s     Heart Disease Paternal Grandmother         CHF     Cancer - colorectal Paternal Grandfather          dx 90       Additional medical/Social/Surgical histories reviewed in [facility] and updated as appropriate.     REVIEW OF SYSTEMS (2020)  10 point ROS of systems including Constitutional, Eyes, Respiratory, Cardiovascular, Gastroenterology, Genitourinary, Integumentary, Musculoskeletal, Psychiatric were all negative except for pertinent positives noted in my HPI.     PHYSICAL EXAM  VSS    General  - normal appearance, in no obvious distress  CV  - normal pulses at posterior tib and dorsalis  pedis  Pulm  - normal respiratory pattern, non-labored  Musculoskeletal - left foot  - stance: gait favors affected side, not reluctant to bear weight  - inspection: no swelling laterally, normal bone and joint alignment, no obvious deformity  - palpation: tenderness over metatarsals soft tissue, no tenderness over lateral or medial malleoli, navicular, or base of 5th MT  - ROM: intact globally but not limited secondary to pain  - strength: 5/5 in eversion, 5/5 in all other planes  Neuro  - no sensory or motor deficit, grossly normal coordination, normal muscle tone  Skin  - no ecchymosis overlying lateral foot-ankle junction, no warmth or induration, no obvious rash  Psych  - interactive, appropriate, normal mood and affect  ASSESSMENT & PLAN  53 yo female with metatarsalgia, recent stress fractures of metatarsals left foot  Cont. PRN HEP  voltaren PRN  Ice PRN  Consider stiff soled shoe/boot if worsens    Adi Minor MD, CAQSM

## 2020-01-19 NOTE — PROGRESS NOTES
HISTORY OF PRESENT ILLNESS  Ms. Jose Orta is a pleasant 54 year old year old female who presents to clinic today left foot pain  Had recent MRI of foot  Feeling a little better  Has some pain with walking and standing a lot    MEDICAL HISTORY  Patient Active Problem List   Diagnosis     Irregular menstrual cycle     CARDIOVASCULAR SCREENING; LDL GOAL LESS THAN 160     Family history of nonmelanoma skin cancer       Current Outpatient Medications   Medication Sig Dispense Refill     diclofenac (VOLTAREN) 75 MG EC tablet Take 1 tablet (75 mg) by mouth 2 times daily 40 tablet 1     multivitamin, therapeutic with minerals (MULTI-VITAMIN) TABS Take 1 tablet by mouth daily       VITAMIN D, CHOLECALCIFEROL, PO Take 400 Units by mouth 2 times daily         Allergies   Allergen Reactions     No Known Drug Allergies        Family History   Problem Relation Age of Onset     Cancer Mother         leukemia -  age 32     Lipids Father      Eye Disorder Father         cataract     Breast Cancer Maternal Grandmother         dx 70s     Heart Disease Paternal Grandmother         CHF     Cancer - colorectal Paternal Grandfather          dx 90       Additional medical/Social/Surgical histories reviewed in [facility] and updated as appropriate.     REVIEW OF SYSTEMS (2020)  10 point ROS of systems including Constitutional, Eyes, Respiratory, Cardiovascular, Gastroenterology, Genitourinary, Integumentary, Musculoskeletal, Psychiatric were all negative except for pertinent positives noted in my HPI.     PHYSICAL EXAM  VSS    General  - normal appearance, in no obvious distress  CV  - normal pulses at posterior tib and dorsalis pedis  Pulm  - normal respiratory pattern, non-labored  Musculoskeletal - left foot  - stance: gait favors affected side, not reluctant to bear weight  - inspection: no swelling laterally, normal bone and joint alignment, no obvious deformity  - palpation: tenderness over metatarsals soft tissue, no  tenderness over lateral or medial malleoli, navicular, or base of 5th MT  - ROM: intact globally but not limited secondary to pain  - strength: 5/5 in eversion, 5/5 in all other planes  Neuro  - no sensory or motor deficit, grossly normal coordination, normal muscle tone  Skin  - no ecchymosis overlying lateral foot-ankle junction, no warmth or induration, no obvious rash  Psych  - interactive, appropriate, normal mood and affect  ASSESSMENT & PLAN  53 yo female with metatarsalgia, recent stress fractures of metatarsals left foot  Cont. PRN HEP  voltaren PRN  Ice PRN  Consider stiff soled shoe/boot if worsens      Adi Minor MD, CAQSM

## 2020-02-02 ENCOUNTER — MYC REFILL (OUTPATIENT)
Dept: ORTHOPEDICS | Facility: CLINIC | Age: 55
End: 2020-02-02

## 2020-02-02 DIAGNOSIS — M77.42 METATARSALGIA OF LEFT FOOT: ICD-10-CM

## 2020-02-03 RX ORDER — DICLOFENAC SODIUM 75 MG/1
75 TABLET, DELAYED RELEASE ORAL 2 TIMES DAILY
Qty: 40 TABLET | Refills: 1 | Status: SHIPPED | OUTPATIENT
Start: 2020-02-03

## 2020-07-29 ENCOUNTER — TELEPHONE (OUTPATIENT)
Dept: DERMATOLOGY | Facility: CLINIC | Age: 55
End: 2020-07-29

## 2020-07-29 NOTE — TELEPHONE ENCOUNTER
7/29 Provided phone number [phone] to schedule in about 1 year (around 11/27/2020).    Melani Degroot   Procedure    Ortho/Sports Med/Pod/Ent/Eye/Surgical Specialties  [facility] [place]   [phone]

## 2020-09-29 ENCOUNTER — TELEPHONE (OUTPATIENT)
Dept: DERMATOLOGY | Facility: CLINIC | Age: 55
End: 2020-09-29

## 2020-09-29 NOTE — TELEPHONE ENCOUNTER
9/29 Provided phone number [phone] to schedule  in about 1 year (around 11/27/2020).    Melani Degroot   Procedure    Ortho/Sports Med/Pod/Ent/Eye/Surgical Specialties  [facility] [place]   [phone]

## 2020-09-29 NOTE — LETTER
October 29, 2020      Kristin Orta  [address] [facility] [place] [facility] [address]        Dear Kristin Orta,    In order to ensure that we are providing the best quality care, we would like to remind you that you are due for a annual follow up appointment with DARRELL Cordon around 11/27/2020.      We have been unable to reach you by phone (or [employer]). Please call your clinic or use [employer] to make an appointment with your provider before you run out of medication.  Please let us know if you have any questions and we would be happy to help.     Thank you for trusting us with your care.    Sincerely,     [facility]  [phone]

## 2020-10-29 NOTE — TELEPHONE ENCOUNTER
3rd attempt. Patient has not called to schedule.  Appointment reminder letter sent to patient.      Camille Griffiths  Surgical Specialties Procedure   [facility] [place]  10/29/2020 8:33 AM

## 2021-01-09 ENCOUNTER — HEALTH MAINTENANCE LETTER (OUTPATIENT)
Age: 56
End: 2021-01-09

## 2021-10-23 ENCOUNTER — HEALTH MAINTENANCE LETTER (OUTPATIENT)
Age: 56
End: 2021-10-23

## 2022-02-12 ENCOUNTER — HEALTH MAINTENANCE LETTER (OUTPATIENT)
Age: 57
End: 2022-02-12

## 2022-10-09 ENCOUNTER — HEALTH MAINTENANCE LETTER (OUTPATIENT)
Age: 57
End: 2022-10-09

## 2023-03-25 ENCOUNTER — HEALTH MAINTENANCE LETTER (OUTPATIENT)
Age: 58
End: 2023-03-25

## (undated) DEVICE — SOL WATER IRRIG 1000ML BOTTLE 07139-09